# Patient Record
Sex: MALE | Race: WHITE | Employment: STUDENT | URBAN - METROPOLITAN AREA
[De-identification: names, ages, dates, MRNs, and addresses within clinical notes are randomized per-mention and may not be internally consistent; named-entity substitution may affect disease eponyms.]

---

## 2023-04-27 ENCOUNTER — HOSPITAL ENCOUNTER (INPATIENT)
Age: 23
LOS: 16 days | Discharge: HOME HEALTH | End: 2023-05-13
Attending: PHYSICAL MEDICINE & REHABILITATION | Admitting: PHYSICAL MEDICINE & REHABILITATION
Payer: MEDICARE

## 2023-04-27 DIAGNOSIS — Z74.09 IMPAIRED MOBILITY AND ADLS: Primary | ICD-10-CM

## 2023-04-27 DIAGNOSIS — F81.9 DEVELOPMENTAL DISORDER OF SCHOLASTIC SKILLS, UNSPECIFIED: ICD-10-CM

## 2023-04-27 DIAGNOSIS — C71.6 CHILDHOOD MEDULLOBLASTOMA (HCC): ICD-10-CM

## 2023-04-27 DIAGNOSIS — I63.212 CEREBROVASCULAR ACCIDENT (CVA) DUE TO STENOSIS OF LEFT VERTEBRAL ARTERY (HCC): ICD-10-CM

## 2023-04-27 DIAGNOSIS — Z78.9 IMPAIRED MOBILITY AND ADLS: Primary | ICD-10-CM

## 2023-04-27 PROCEDURE — 6370000000 HC RX 637 (ALT 250 FOR IP): Performed by: PHYSICAL MEDICINE & REHABILITATION

## 2023-04-27 PROCEDURE — 1180000000 HC REHAB R&B

## 2023-04-27 RX ORDER — LANOLIN ALCOHOL/MO/W.PET/CERES
3 CREAM (GRAM) TOPICAL NIGHTLY PRN
Status: DISCONTINUED | OUTPATIENT
Start: 2023-04-27 | End: 2023-05-13 | Stop reason: HOSPADM

## 2023-04-27 RX ORDER — ASPIRIN 81 MG/1
81 TABLET, CHEWABLE ORAL DAILY
Status: DISCONTINUED | OUTPATIENT
Start: 2023-04-29 | End: 2023-05-13 | Stop reason: HOSPADM

## 2023-04-27 RX ORDER — CLOPIDOGREL BISULFATE 75 MG/1
75 TABLET ORAL DAILY
Status: DISCONTINUED | OUTPATIENT
Start: 2023-04-27 | End: 2023-05-13 | Stop reason: HOSPADM

## 2023-04-27 RX ORDER — ATORVASTATIN CALCIUM 40 MG/1
40 TABLET, FILM COATED ORAL NIGHTLY
Status: DISCONTINUED | OUTPATIENT
Start: 2023-04-27 | End: 2023-05-13 | Stop reason: HOSPADM

## 2023-04-27 RX ORDER — CHOLECALCIFEROL (VITAMIN D3) 125 MCG
500 CAPSULE ORAL DAILY
Status: DISCONTINUED | OUTPATIENT
Start: 2023-04-27 | End: 2023-05-13 | Stop reason: HOSPADM

## 2023-04-27 RX ORDER — LANOLIN ALCOHOL/MO/W.PET/CERES
25 CREAM (GRAM) TOPICAL DAILY
Status: DISCONTINUED | OUTPATIENT
Start: 2023-04-27 | End: 2023-05-13 | Stop reason: HOSPADM

## 2023-04-27 RX ORDER — FOLIC ACID 1 MG/1
1 TABLET ORAL DAILY
Status: DISCONTINUED | OUTPATIENT
Start: 2023-04-27 | End: 2023-05-13 | Stop reason: HOSPADM

## 2023-04-27 RX ADMIN — ATORVASTATIN CALCIUM 40 MG: 40 TABLET, FILM COATED ORAL at 21:16

## 2023-04-28 PROBLEM — H47.293 OTHER OPTIC ATROPHY, BILATERAL: Status: ACTIVE | Noted: 2020-01-27

## 2023-04-28 PROBLEM — G11.9 HEREDITARY ATAXIA, UNSPECIFIED (HCC): Status: ACTIVE | Noted: 2018-12-17

## 2023-04-28 PROBLEM — Z78.9 IMPAIRED MOBILITY AND ADLS: Status: ACTIVE | Noted: 2023-04-28

## 2023-04-28 PROBLEM — R13.12 DYSPHAGIA, OROPHARYNGEAL PHASE: Status: ACTIVE | Noted: 2023-04-28

## 2023-04-28 PROBLEM — Z74.09 IMPAIRED MOBILITY AND ADLS: Status: ACTIVE | Noted: 2023-04-28

## 2023-04-28 PROBLEM — R47.01 APHASIA: Status: ACTIVE | Noted: 2023-04-28

## 2023-04-28 PROBLEM — F81.9 DEVELOPMENTAL DISORDER OF SCHOLASTIC SKILLS, UNSPECIFIED: Status: ACTIVE | Noted: 2018-11-21

## 2023-04-28 PROBLEM — H25.043 POSTERIOR SUBCAPSULAR POLAR AGE-RELATED CATARACT OF BOTH EYES: Status: ACTIVE | Noted: 2020-01-27

## 2023-04-28 PROBLEM — I63.212 CEREBROVASCULAR ACCIDENT (CVA) DUE TO STENOSIS OF LEFT VERTEBRAL ARTERY (HCC): Status: ACTIVE | Noted: 2023-04-28

## 2023-04-28 PROCEDURE — 97530 THERAPEUTIC ACTIVITIES: CPT

## 2023-04-28 PROCEDURE — 85302 CLOT INHIBIT PROT C ANTIGEN: CPT

## 2023-04-28 PROCEDURE — 1180000000 HC REHAB R&B

## 2023-04-28 PROCEDURE — 85730 THROMBOPLASTIN TIME PARTIAL: CPT

## 2023-04-28 PROCEDURE — 81241 F5 GENE: CPT

## 2023-04-28 PROCEDURE — 97166 OT EVAL MOD COMPLEX 45 MIN: CPT

## 2023-04-28 PROCEDURE — 92610 EVALUATE SWALLOWING FUNCTION: CPT

## 2023-04-28 PROCEDURE — 92523 SPEECH SOUND LANG COMPREHEN: CPT

## 2023-04-28 PROCEDURE — 81291 MTHFR GENE: CPT

## 2023-04-28 PROCEDURE — 97533 SENSORY INTEGRATION: CPT

## 2023-04-28 PROCEDURE — 85305 CLOT INHIBIT PROT S TOTAL: CPT

## 2023-04-28 PROCEDURE — 97116 GAIT TRAINING THERAPY: CPT

## 2023-04-28 PROCEDURE — 86147 CARDIOLIPIN ANTIBODY EA IG: CPT

## 2023-04-28 PROCEDURE — 6360000002 HC RX W HCPCS: Performed by: PHYSICAL MEDICINE & REHABILITATION

## 2023-04-28 PROCEDURE — 82607 VITAMIN B-12: CPT

## 2023-04-28 PROCEDURE — 83090 ASSAY OF HOMOCYSTEINE: CPT

## 2023-04-28 PROCEDURE — 97162 PT EVAL MOD COMPLEX 30 MIN: CPT

## 2023-04-28 PROCEDURE — 85613 RUSSELL VIPER VENOM DILUTED: CPT

## 2023-04-28 PROCEDURE — APPSS45 APP SPLIT SHARED TIME 31-45 MINUTES: Performed by: NURSE PRACTITIONER

## 2023-04-28 PROCEDURE — 97129 THER IVNTJ 1ST 15 MIN: CPT

## 2023-04-28 PROCEDURE — 99222 1ST HOSP IP/OBS MODERATE 55: CPT | Performed by: PHYSICAL MEDICINE & REHABILITATION

## 2023-04-28 PROCEDURE — 99222 1ST HOSP IP/OBS MODERATE 55: CPT | Performed by: PSYCHIATRY & NEUROLOGY

## 2023-04-28 PROCEDURE — 97112 NEUROMUSCULAR REEDUCATION: CPT

## 2023-04-28 PROCEDURE — 85610 PROTHROMBIN TIME: CPT

## 2023-04-28 PROCEDURE — 86225 DNA ANTIBODY NATIVE: CPT

## 2023-04-28 PROCEDURE — 6370000000 HC RX 637 (ALT 250 FOR IP): Performed by: PHYSICAL MEDICINE & REHABILITATION

## 2023-04-28 PROCEDURE — 82746 ASSAY OF FOLIC ACID SERUM: CPT

## 2023-04-28 RX ORDER — VITAMIN B COMPLEX
2000 TABLET ORAL
Status: DISCONTINUED | OUTPATIENT
Start: 2023-04-28 | End: 2023-05-13 | Stop reason: HOSPADM

## 2023-04-28 RX ORDER — SODIUM PHOSPHATE, DIBASIC AND SODIUM PHOSPHATE, MONOBASIC 7; 19 G/133ML; G/133ML
1 ENEMA RECTAL DAILY PRN
Status: DISCONTINUED | OUTPATIENT
Start: 2023-04-28 | End: 2023-05-13 | Stop reason: HOSPADM

## 2023-04-28 RX ORDER — UBIDECARENONE 100 MG
100 CAPSULE ORAL DAILY
Status: DISCONTINUED | OUTPATIENT
Start: 2023-04-28 | End: 2023-05-13 | Stop reason: HOSPADM

## 2023-04-28 RX ORDER — BISACODYL 10 MG
10 SUPPOSITORY, RECTAL RECTAL DAILY PRN
Status: DISCONTINUED | OUTPATIENT
Start: 2023-04-28 | End: 2023-05-13 | Stop reason: HOSPADM

## 2023-04-28 RX ORDER — CYANOCOBALAMIN 1000 UG/ML
1000 INJECTION, SOLUTION INTRAMUSCULAR; SUBCUTANEOUS WEEKLY
Status: DISCONTINUED | OUTPATIENT
Start: 2023-04-28 | End: 2023-05-13 | Stop reason: HOSPADM

## 2023-04-28 RX ORDER — LIDOCAINE 4 G/G
3 PATCH TOPICAL DAILY
Status: DISCONTINUED | OUTPATIENT
Start: 2023-04-28 | End: 2023-05-02

## 2023-04-28 RX ORDER — ACETAMINOPHEN 325 MG/1
650 TABLET ORAL EVERY 4 HOURS PRN
Status: DISCONTINUED | OUTPATIENT
Start: 2023-04-28 | End: 2023-05-13 | Stop reason: HOSPADM

## 2023-04-28 RX ADMIN — Medication 100 MG: at 09:14

## 2023-04-28 RX ADMIN — Medication 25 MG: at 09:15

## 2023-04-28 RX ADMIN — ATORVASTATIN CALCIUM 40 MG: 40 TABLET, FILM COATED ORAL at 20:47

## 2023-04-28 RX ADMIN — CYANOCOBALAMIN 1000 MCG: 1000 INJECTION, SOLUTION INTRAMUSCULAR; SUBCUTANEOUS at 09:14

## 2023-04-28 RX ADMIN — CYANOCOBALAMIN TAB 500 MCG 500 MCG: 500 TAB at 09:15

## 2023-04-28 RX ADMIN — CLOPIDOGREL BISULFATE 75 MG: 75 TABLET ORAL at 09:15

## 2023-04-28 RX ADMIN — Medication 2000 UNITS: at 17:35

## 2023-04-28 RX ADMIN — FOLIC ACID 1 MG: 1 TABLET ORAL at 09:14

## 2023-04-28 ASSESSMENT — ENCOUNTER SYMPTOMS
CHEST TIGHTNESS: 0
TROUBLE SWALLOWING: 0
VOICE CHANGE: 1
BLOOD IN STOOL: 0
SHORTNESS OF BREATH: 1
VOMITING: 0
STRIDOR: 0
NAUSEA: 0
DIARRHEA: 0
CONSTIPATION: 1
VISUAL CHANGE: 1
COUGH: 0
BACK PAIN: 1
SORE THROAT: 0
WHEEZING: 0
SHORTNESS OF BREATH: 0
EYES NEGATIVE: 1
EYE REDNESS: 0
EYE PAIN: 0
COLOR CHANGE: 0
PHOTOPHOBIA: 0
TROUBLE SWALLOWING: 1
ABDOMINAL PAIN: 0

## 2023-04-29 PROBLEM — R27.0 ATAXIA: Status: ACTIVE | Noted: 2018-12-17

## 2023-04-29 LAB
FOLATE: 17 NG/ML
HOMOCYSTEINE: 40.8 UMOL/L
VITAMIN B-12: >2000 PG/ML (ref 232–1245)

## 2023-04-29 PROCEDURE — 97530 THERAPEUTIC ACTIVITIES: CPT

## 2023-04-29 PROCEDURE — 1180000000 HC REHAB R&B

## 2023-04-29 PROCEDURE — 97110 THERAPEUTIC EXERCISES: CPT

## 2023-04-29 PROCEDURE — 99222 1ST HOSP IP/OBS MODERATE 55: CPT | Performed by: INTERNAL MEDICINE

## 2023-04-29 PROCEDURE — 6370000000 HC RX 637 (ALT 250 FOR IP): Performed by: PHYSICAL MEDICINE & REHABILITATION

## 2023-04-29 PROCEDURE — 97116 GAIT TRAINING THERAPY: CPT

## 2023-04-29 PROCEDURE — 97535 SELF CARE MNGMENT TRAINING: CPT

## 2023-04-29 RX ADMIN — CYANOCOBALAMIN TAB 500 MCG 500 MCG: 500 TAB at 09:15

## 2023-04-29 RX ADMIN — Medication 100 MG: at 09:15

## 2023-04-29 RX ADMIN — ASPIRIN 81 MG CHEWABLE TABLET 81 MG: 81 TABLET CHEWABLE at 09:15

## 2023-04-29 RX ADMIN — CLOPIDOGREL BISULFATE 75 MG: 75 TABLET ORAL at 09:15

## 2023-04-29 RX ADMIN — FOLIC ACID 1 MG: 1 TABLET ORAL at 09:15

## 2023-04-29 RX ADMIN — ATORVASTATIN CALCIUM 40 MG: 40 TABLET, FILM COATED ORAL at 22:09

## 2023-04-29 RX ADMIN — Medication 25 MG: at 09:15

## 2023-04-29 RX ADMIN — Medication 2000 UNITS: at 16:56

## 2023-04-30 PROBLEM — I63.20 CEREBROVASCULAR ACCIDENT (CVA) DUE TO STENOSIS OF PRECEREBRAL ARTERY (HCC): Status: ACTIVE | Noted: 2023-04-28

## 2023-04-30 PROCEDURE — 99233 SBSQ HOSP IP/OBS HIGH 50: CPT | Performed by: PHYSICAL MEDICINE & REHABILITATION

## 2023-04-30 PROCEDURE — 99232 SBSQ HOSP IP/OBS MODERATE 35: CPT | Performed by: INTERNAL MEDICINE

## 2023-04-30 PROCEDURE — 1180000000 HC REHAB R&B

## 2023-04-30 PROCEDURE — 6370000000 HC RX 637 (ALT 250 FOR IP): Performed by: PHYSICAL MEDICINE & REHABILITATION

## 2023-04-30 RX ADMIN — Medication 100 MG: at 08:28

## 2023-04-30 RX ADMIN — CLOPIDOGREL BISULFATE 75 MG: 75 TABLET ORAL at 08:28

## 2023-04-30 RX ADMIN — CYANOCOBALAMIN TAB 500 MCG 500 MCG: 500 TAB at 08:28

## 2023-04-30 RX ADMIN — FOLIC ACID 1 MG: 1 TABLET ORAL at 08:28

## 2023-04-30 RX ADMIN — Medication 2000 UNITS: at 16:44

## 2023-04-30 RX ADMIN — ATORVASTATIN CALCIUM 40 MG: 40 TABLET, FILM COATED ORAL at 20:40

## 2023-04-30 RX ADMIN — ASPIRIN 81 MG CHEWABLE TABLET 81 MG: 81 TABLET CHEWABLE at 08:32

## 2023-04-30 RX ADMIN — Medication 25 MG: at 08:28

## 2023-05-01 LAB
ANION GAP SERPL CALCULATED.3IONS-SCNC: 13 MEQ/L (ref 9–15)
APTT IMM NP PPP: NORMAL SEC (ref 32–48)
APTT P HEP NEUT PPP: NORMAL SEC (ref 32–48)
BUN SERPL-MCNC: 14 MG/DL (ref 6–20)
CALCIUM SERPL-MCNC: 9.7 MG/DL (ref 8.5–9.9)
CARDIOLIPIN IGG SER IA-ACNC: <10 GPL
CARDIOLIPIN IGM SER IA-ACNC: <10 MPL
CHLORIDE SERPL-SCNC: 100 MEQ/L (ref 95–107)
CO2 SERPL-SCNC: 22 MEQ/L (ref 20–31)
CONFIRM APTT STACLOT: NORMAL
CREAT SERPL-MCNC: 0.87 MG/DL (ref 0.7–1.2)
DRVVT SCREEN TO CONFIRM RATIO: NORMAL RATIO
ERYTHROCYTE [DISTWIDTH] IN BLOOD BY AUTOMATED COUNT: 13.7 % (ref 11.5–14.5)
GLUCOSE SERPL-MCNC: 99 MG/DL (ref 70–99)
HCT VFR BLD AUTO: 41 % (ref 42–52)
HGB BLD-MCNC: 14.3 G/DL (ref 14–18)
LA 3 SCREEN W REFLEX-IMP: NORMAL
LA NT DPL PPP QL: NORMAL
LV EF: 58 %
LVEF MODALITY: NORMAL
MCH RBC QN AUTO: 29.9 PG (ref 27–31.3)
MCHC RBC AUTO-ENTMCNC: 34.9 % (ref 33–37)
MCV RBC AUTO: 85.8 FL (ref 79–92.2)
MIXING DRVVT: NORMAL SEC (ref 33–44)
PLATELET # BLD AUTO: 360 K/UL (ref 130–400)
POTASSIUM SERPL-SCNC: 4.3 MEQ/L (ref 3.4–4.9)
PROT C AG ACT/NOR PPP IA: >95 % (ref 63–153)
PROT S AG ACT/NOR PPP IA: 135 % (ref 84–134)
PROTHROMBIN TIME: 12.9 SEC (ref 12–15.5)
RBC # BLD AUTO: 4.77 M/UL (ref 4.7–6.1)
REPTILASE TIME: NORMAL SEC
SCREEN APTT: 44 SEC (ref 32–48)
SCREEN DRVVT: 34 SEC (ref 33–44)
SODIUM SERPL-SCNC: 135 MEQ/L (ref 135–144)
THROMBIN TIME: NORMAL SEC (ref 14.7–19.5)
WBC # BLD AUTO: 10.4 K/UL (ref 4.8–10.8)

## 2023-05-01 PROCEDURE — 80048 BASIC METABOLIC PNL TOTAL CA: CPT

## 2023-05-01 PROCEDURE — 99233 SBSQ HOSP IP/OBS HIGH 50: CPT | Performed by: PHYSICAL MEDICINE & REHABILITATION

## 2023-05-01 PROCEDURE — 93312 ECHO TRANSESOPHAGEAL: CPT | Performed by: INTERNAL MEDICINE

## 2023-05-01 PROCEDURE — 36415 COLL VENOUS BLD VENIPUNCTURE: CPT

## 2023-05-01 PROCEDURE — 97533 SENSORY INTEGRATION: CPT

## 2023-05-01 PROCEDURE — 6360000002 HC RX W HCPCS: Performed by: INTERNAL MEDICINE

## 2023-05-01 PROCEDURE — 2580000003 HC RX 258: Performed by: INTERNAL MEDICINE

## 2023-05-01 PROCEDURE — 6360000002 HC RX W HCPCS

## 2023-05-01 PROCEDURE — 99231 SBSQ HOSP IP/OBS SF/LOW 25: CPT | Performed by: NURSE PRACTITIONER

## 2023-05-01 PROCEDURE — 93325 DOPPLER ECHO COLOR FLOW MAPG: CPT

## 2023-05-01 PROCEDURE — 85027 COMPLETE CBC AUTOMATED: CPT

## 2023-05-01 PROCEDURE — 1180000000 HC REHAB R&B

## 2023-05-01 PROCEDURE — 6370000000 HC RX 637 (ALT 250 FOR IP): Performed by: PHYSICAL MEDICINE & REHABILITATION

## 2023-05-01 PROCEDURE — 97535 SELF CARE MNGMENT TRAINING: CPT

## 2023-05-01 PROCEDURE — 97116 GAIT TRAINING THERAPY: CPT

## 2023-05-01 PROCEDURE — 97530 THERAPEUTIC ACTIVITIES: CPT

## 2023-05-01 PROCEDURE — 97110 THERAPEUTIC EXERCISES: CPT

## 2023-05-01 PROCEDURE — 93312 ECHO TRANSESOPHAGEAL: CPT

## 2023-05-01 PROCEDURE — 6370000000 HC RX 637 (ALT 250 FOR IP)

## 2023-05-01 PROCEDURE — 93321 DOPPLER ECHO F-UP/LMTD STD: CPT

## 2023-05-01 RX ORDER — MIDAZOLAM HYDROCHLORIDE 2 MG/2ML
INJECTION, SOLUTION INTRAMUSCULAR; INTRAVENOUS
Status: COMPLETED | OUTPATIENT
Start: 2023-05-01 | End: 2023-05-01

## 2023-05-01 RX ORDER — SODIUM CHLORIDE 0.9 % (FLUSH) 0.9 %
5-40 SYRINGE (ML) INJECTION EVERY 12 HOURS SCHEDULED
Status: DISCONTINUED | OUTPATIENT
Start: 2023-05-01 | End: 2023-05-02 | Stop reason: ALTCHOICE

## 2023-05-01 RX ORDER — FENTANYL CITRATE 0.05 MG/ML
INJECTION, SOLUTION INTRAMUSCULAR; INTRAVENOUS
Status: COMPLETED | OUTPATIENT
Start: 2023-05-01 | End: 2023-05-01

## 2023-05-01 RX ORDER — SODIUM CHLORIDE 9 MG/ML
INJECTION, SOLUTION INTRAVENOUS PRN
Status: DISCONTINUED | OUTPATIENT
Start: 2023-05-01 | End: 2023-05-13 | Stop reason: HOSPADM

## 2023-05-01 RX ORDER — SODIUM CHLORIDE 0.9 % (FLUSH) 0.9 %
5-40 SYRINGE (ML) INJECTION PRN
Status: DISCONTINUED | OUTPATIENT
Start: 2023-05-01 | End: 2023-05-13 | Stop reason: HOSPADM

## 2023-05-01 RX ADMIN — FENTANYL CITRATE 50 MCG: 0.05 INJECTION, SOLUTION INTRAMUSCULAR; INTRAVENOUS at 11:32

## 2023-05-01 RX ADMIN — Medication 10 ML: at 13:16

## 2023-05-01 RX ADMIN — MIDAZOLAM HYDROCHLORIDE 2 MG: 1 INJECTION, SOLUTION INTRAMUSCULAR; INTRAVENOUS at 11:32

## 2023-05-01 RX ADMIN — ATORVASTATIN CALCIUM 40 MG: 40 TABLET, FILM COATED ORAL at 21:28

## 2023-05-01 RX ADMIN — Medication 25 MG: at 09:00

## 2023-05-01 RX ADMIN — FENTANYL CITRATE 25 MCG: 0.05 INJECTION, SOLUTION INTRAMUSCULAR; INTRAVENOUS at 11:34

## 2023-05-01 RX ADMIN — FENTANYL CITRATE 25 MCG: 0.05 INJECTION, SOLUTION INTRAMUSCULAR; INTRAVENOUS at 11:38

## 2023-05-01 RX ADMIN — MIDAZOLAM HYDROCHLORIDE 1 MG: 1 INJECTION, SOLUTION INTRAMUSCULAR; INTRAVENOUS at 11:43

## 2023-05-01 RX ADMIN — CYANOCOBALAMIN TAB 500 MCG 500 MCG: 500 TAB at 09:00

## 2023-05-01 RX ADMIN — MIDAZOLAM HYDROCHLORIDE 1 MG: 1 INJECTION, SOLUTION INTRAMUSCULAR; INTRAVENOUS at 11:34

## 2023-05-01 RX ADMIN — FOLIC ACID 1 MG: 1 TABLET ORAL at 09:00

## 2023-05-01 RX ADMIN — Medication 2000 UNITS: at 16:16

## 2023-05-01 RX ADMIN — ASPIRIN 81 MG CHEWABLE TABLET 81 MG: 81 TABLET CHEWABLE at 13:14

## 2023-05-01 RX ADMIN — CLOPIDOGREL BISULFATE 75 MG: 75 TABLET ORAL at 13:14

## 2023-05-01 RX ADMIN — Medication 100 MG: at 09:00

## 2023-05-01 RX ADMIN — MIDAZOLAM HYDROCHLORIDE 1 MG: 1 INJECTION, SOLUTION INTRAMUSCULAR; INTRAVENOUS at 11:38

## 2023-05-01 ASSESSMENT — ENCOUNTER SYMPTOMS
ABDOMINAL PAIN: 0
VOMITING: 0
ABDOMINAL DISTENTION: 0
WHEEZING: 0
COLOR CHANGE: 0
CHEST TIGHTNESS: 0
NAUSEA: 0
COUGH: 0
TROUBLE SWALLOWING: 0
SHORTNESS OF BREATH: 0

## 2023-05-02 LAB
MTHFR C.1298A>C GENO BLD/T: NEGATIVE
MTHFR C.677C>T GENO BLD/T: NORMAL
MTHFR GENE MUT ANL BLD/T: NORMAL
SPECIMEN SOURCE: NORMAL

## 2023-05-02 PROCEDURE — 99232 SBSQ HOSP IP/OBS MODERATE 35: CPT | Performed by: PHYSICAL MEDICINE & REHABILITATION

## 2023-05-02 PROCEDURE — 1180000000 HC REHAB R&B

## 2023-05-02 PROCEDURE — 97116 GAIT TRAINING THERAPY: CPT

## 2023-05-02 PROCEDURE — 97112 NEUROMUSCULAR REEDUCATION: CPT

## 2023-05-02 PROCEDURE — 97110 THERAPEUTIC EXERCISES: CPT

## 2023-05-02 PROCEDURE — 6370000000 HC RX 637 (ALT 250 FOR IP): Performed by: PHYSICAL MEDICINE & REHABILITATION

## 2023-05-02 PROCEDURE — 97530 THERAPEUTIC ACTIVITIES: CPT

## 2023-05-02 PROCEDURE — 97535 SELF CARE MNGMENT TRAINING: CPT

## 2023-05-02 RX ORDER — LIDOCAINE 4 G/G
3 PATCH TOPICAL DAILY PRN
Status: DISCONTINUED | OUTPATIENT
Start: 2023-05-02 | End: 2023-05-13

## 2023-05-02 RX ADMIN — ASPIRIN 81 MG CHEWABLE TABLET 81 MG: 81 TABLET CHEWABLE at 08:37

## 2023-05-02 RX ADMIN — CYANOCOBALAMIN TAB 500 MCG 500 MCG: 500 TAB at 08:37

## 2023-05-02 RX ADMIN — ATORVASTATIN CALCIUM 40 MG: 40 TABLET, FILM COATED ORAL at 20:26

## 2023-05-02 RX ADMIN — CLOPIDOGREL BISULFATE 75 MG: 75 TABLET ORAL at 08:38

## 2023-05-02 RX ADMIN — FOLIC ACID 1 MG: 1 TABLET ORAL at 08:38

## 2023-05-02 RX ADMIN — Medication 2000 UNITS: at 16:28

## 2023-05-02 RX ADMIN — Medication 100 MG: at 08:38

## 2023-05-02 RX ADMIN — Medication 25 MG: at 08:38

## 2023-05-03 LAB — F5 P.R506Q BLD/T QL: NEGATIVE

## 2023-05-03 PROCEDURE — 99231 SBSQ HOSP IP/OBS SF/LOW 25: CPT | Performed by: NURSE PRACTITIONER

## 2023-05-03 PROCEDURE — 99232 SBSQ HOSP IP/OBS MODERATE 35: CPT | Performed by: PHYSICAL MEDICINE & REHABILITATION

## 2023-05-03 PROCEDURE — 97530 THERAPEUTIC ACTIVITIES: CPT

## 2023-05-03 PROCEDURE — 97535 SELF CARE MNGMENT TRAINING: CPT

## 2023-05-03 PROCEDURE — 97116 GAIT TRAINING THERAPY: CPT

## 2023-05-03 PROCEDURE — 97112 NEUROMUSCULAR REEDUCATION: CPT

## 2023-05-03 PROCEDURE — 6370000000 HC RX 637 (ALT 250 FOR IP): Performed by: PHYSICAL MEDICINE & REHABILITATION

## 2023-05-03 PROCEDURE — 1180000000 HC REHAB R&B

## 2023-05-03 RX ADMIN — CYANOCOBALAMIN TAB 500 MCG 500 MCG: 500 TAB at 09:34

## 2023-05-03 RX ADMIN — Medication 25 MG: at 09:34

## 2023-05-03 RX ADMIN — Medication 100 MG: at 09:34

## 2023-05-03 RX ADMIN — ATORVASTATIN CALCIUM 40 MG: 40 TABLET, FILM COATED ORAL at 20:37

## 2023-05-03 RX ADMIN — ASPIRIN 81 MG CHEWABLE TABLET 81 MG: 81 TABLET CHEWABLE at 09:34

## 2023-05-03 RX ADMIN — CLOPIDOGREL BISULFATE 75 MG: 75 TABLET ORAL at 09:34

## 2023-05-03 RX ADMIN — FOLIC ACID 1 MG: 1 TABLET ORAL at 09:34

## 2023-05-03 RX ADMIN — Medication 2000 UNITS: at 18:23

## 2023-05-03 ASSESSMENT — ENCOUNTER SYMPTOMS
VOMITING: 0
COUGH: 0
CHEST TIGHTNESS: 0
ABDOMINAL PAIN: 0
WHEEZING: 0
SHORTNESS OF BREATH: 0
COLOR CHANGE: 0
NAUSEA: 0
TROUBLE SWALLOWING: 0
ABDOMINAL DISTENTION: 0

## 2023-05-04 PROCEDURE — 97535 SELF CARE MNGMENT TRAINING: CPT

## 2023-05-04 PROCEDURE — 99232 SBSQ HOSP IP/OBS MODERATE 35: CPT | Performed by: PHYSICAL MEDICINE & REHABILITATION

## 2023-05-04 PROCEDURE — 97116 GAIT TRAINING THERAPY: CPT

## 2023-05-04 PROCEDURE — 1180000000 HC REHAB R&B

## 2023-05-04 PROCEDURE — 6370000000 HC RX 637 (ALT 250 FOR IP): Performed by: PHYSICAL MEDICINE & REHABILITATION

## 2023-05-04 PROCEDURE — 97530 THERAPEUTIC ACTIVITIES: CPT

## 2023-05-04 PROCEDURE — 97112 NEUROMUSCULAR REEDUCATION: CPT

## 2023-05-04 RX ADMIN — Medication 25 MG: at 08:10

## 2023-05-04 RX ADMIN — ATORVASTATIN CALCIUM 40 MG: 40 TABLET, FILM COATED ORAL at 20:07

## 2023-05-04 RX ADMIN — FOLIC ACID 1 MG: 1 TABLET ORAL at 08:10

## 2023-05-04 RX ADMIN — Medication 100 MG: at 08:10

## 2023-05-04 RX ADMIN — CYANOCOBALAMIN TAB 500 MCG 500 MCG: 500 TAB at 08:10

## 2023-05-04 RX ADMIN — ASPIRIN 81 MG CHEWABLE TABLET 81 MG: 81 TABLET CHEWABLE at 08:10

## 2023-05-04 RX ADMIN — Medication 2000 UNITS: at 17:18

## 2023-05-04 RX ADMIN — CLOPIDOGREL BISULFATE 75 MG: 75 TABLET ORAL at 08:10

## 2023-05-05 PROCEDURE — 97535 SELF CARE MNGMENT TRAINING: CPT

## 2023-05-05 PROCEDURE — 97112 NEUROMUSCULAR REEDUCATION: CPT

## 2023-05-05 PROCEDURE — 97530 THERAPEUTIC ACTIVITIES: CPT

## 2023-05-05 PROCEDURE — 6370000000 HC RX 637 (ALT 250 FOR IP): Performed by: PHYSICAL MEDICINE & REHABILITATION

## 2023-05-05 PROCEDURE — 99232 SBSQ HOSP IP/OBS MODERATE 35: CPT | Performed by: PHYSICAL MEDICINE & REHABILITATION

## 2023-05-05 PROCEDURE — 97116 GAIT TRAINING THERAPY: CPT

## 2023-05-05 PROCEDURE — 97110 THERAPEUTIC EXERCISES: CPT

## 2023-05-05 PROCEDURE — 99231 SBSQ HOSP IP/OBS SF/LOW 25: CPT | Performed by: NURSE PRACTITIONER

## 2023-05-05 PROCEDURE — 1180000000 HC REHAB R&B

## 2023-05-05 PROCEDURE — 6360000002 HC RX W HCPCS: Performed by: PHYSICAL MEDICINE & REHABILITATION

## 2023-05-05 RX ADMIN — ASPIRIN 81 MG CHEWABLE TABLET 81 MG: 81 TABLET CHEWABLE at 08:51

## 2023-05-05 RX ADMIN — FOLIC ACID 1 MG: 1 TABLET ORAL at 08:50

## 2023-05-05 RX ADMIN — Medication 100 MG: at 08:50

## 2023-05-05 RX ADMIN — ATORVASTATIN CALCIUM 40 MG: 40 TABLET, FILM COATED ORAL at 19:40

## 2023-05-05 RX ADMIN — CYANOCOBALAMIN TAB 500 MCG 500 MCG: 500 TAB at 08:51

## 2023-05-05 RX ADMIN — Medication 25 MG: at 08:51

## 2023-05-05 RX ADMIN — CYANOCOBALAMIN 1000 MCG: 1000 INJECTION, SOLUTION INTRAMUSCULAR; SUBCUTANEOUS at 08:51

## 2023-05-05 RX ADMIN — Medication 2000 UNITS: at 17:25

## 2023-05-05 RX ADMIN — CLOPIDOGREL BISULFATE 75 MG: 75 TABLET ORAL at 08:51

## 2023-05-05 ASSESSMENT — ENCOUNTER SYMPTOMS
TROUBLE SWALLOWING: 0
NAUSEA: 0
VOMITING: 0
CHEST TIGHTNESS: 0
WHEEZING: 0
ABDOMINAL PAIN: 0
ABDOMINAL DISTENTION: 0
SHORTNESS OF BREATH: 0
COUGH: 0
COLOR CHANGE: 0

## 2023-05-06 PROCEDURE — 97112 NEUROMUSCULAR REEDUCATION: CPT

## 2023-05-06 PROCEDURE — 6370000000 HC RX 637 (ALT 250 FOR IP): Performed by: PHYSICAL MEDICINE & REHABILITATION

## 2023-05-06 PROCEDURE — 97116 GAIT TRAINING THERAPY: CPT

## 2023-05-06 PROCEDURE — 1180000000 HC REHAB R&B

## 2023-05-06 RX ADMIN — Medication 25 MG: at 10:05

## 2023-05-06 RX ADMIN — CYANOCOBALAMIN TAB 500 MCG 500 MCG: 500 TAB at 10:05

## 2023-05-06 RX ADMIN — ATORVASTATIN CALCIUM 40 MG: 40 TABLET, FILM COATED ORAL at 23:30

## 2023-05-06 RX ADMIN — Medication 2000 UNITS: at 17:14

## 2023-05-06 RX ADMIN — CLOPIDOGREL BISULFATE 75 MG: 75 TABLET ORAL at 10:06

## 2023-05-06 RX ADMIN — FOLIC ACID 1 MG: 1 TABLET ORAL at 10:06

## 2023-05-06 RX ADMIN — Medication 100 MG: at 10:06

## 2023-05-06 RX ADMIN — ASPIRIN 81 MG CHEWABLE TABLET 81 MG: 81 TABLET CHEWABLE at 10:06

## 2023-05-07 PROCEDURE — 6370000000 HC RX 637 (ALT 250 FOR IP): Performed by: PHYSICAL MEDICINE & REHABILITATION

## 2023-05-07 PROCEDURE — 1180000000 HC REHAB R&B

## 2023-05-07 RX ADMIN — CLOPIDOGREL BISULFATE 75 MG: 75 TABLET ORAL at 08:55

## 2023-05-07 RX ADMIN — Medication 2000 UNITS: at 17:15

## 2023-05-07 RX ADMIN — Medication 25 MG: at 08:55

## 2023-05-07 RX ADMIN — CYANOCOBALAMIN TAB 500 MCG 500 MCG: 500 TAB at 08:55

## 2023-05-07 RX ADMIN — ASPIRIN 81 MG CHEWABLE TABLET 81 MG: 81 TABLET CHEWABLE at 08:55

## 2023-05-07 RX ADMIN — FOLIC ACID 1 MG: 1 TABLET ORAL at 08:56

## 2023-05-07 RX ADMIN — Medication 100 MG: at 08:55

## 2023-05-07 RX ADMIN — ATORVASTATIN CALCIUM 40 MG: 40 TABLET, FILM COATED ORAL at 20:06

## 2023-05-08 PROCEDURE — 97535 SELF CARE MNGMENT TRAINING: CPT

## 2023-05-08 PROCEDURE — 97110 THERAPEUTIC EXERCISES: CPT

## 2023-05-08 PROCEDURE — 6370000000 HC RX 637 (ALT 250 FOR IP): Performed by: PHYSICAL MEDICINE & REHABILITATION

## 2023-05-08 PROCEDURE — 99231 SBSQ HOSP IP/OBS SF/LOW 25: CPT | Performed by: NURSE PRACTITIONER

## 2023-05-08 PROCEDURE — 97112 NEUROMUSCULAR REEDUCATION: CPT

## 2023-05-08 PROCEDURE — 1180000000 HC REHAB R&B

## 2023-05-08 PROCEDURE — 99233 SBSQ HOSP IP/OBS HIGH 50: CPT | Performed by: PHYSICAL MEDICINE & REHABILITATION

## 2023-05-08 PROCEDURE — 97116 GAIT TRAINING THERAPY: CPT

## 2023-05-08 PROCEDURE — 97530 THERAPEUTIC ACTIVITIES: CPT

## 2023-05-08 RX ADMIN — ASPIRIN 81 MG CHEWABLE TABLET 81 MG: 81 TABLET CHEWABLE at 08:36

## 2023-05-08 RX ADMIN — Medication 2000 UNITS: at 16:56

## 2023-05-08 RX ADMIN — ATORVASTATIN CALCIUM 40 MG: 40 TABLET, FILM COATED ORAL at 19:34

## 2023-05-08 RX ADMIN — CYANOCOBALAMIN TAB 500 MCG 500 MCG: 500 TAB at 08:36

## 2023-05-08 RX ADMIN — Medication 25 MG: at 08:36

## 2023-05-08 RX ADMIN — CLOPIDOGREL BISULFATE 75 MG: 75 TABLET ORAL at 08:36

## 2023-05-08 RX ADMIN — FOLIC ACID 1 MG: 1 TABLET ORAL at 08:36

## 2023-05-08 RX ADMIN — Medication 100 MG: at 08:36

## 2023-05-08 ASSESSMENT — ENCOUNTER SYMPTOMS
VOMITING: 0
NAUSEA: 0
COLOR CHANGE: 0
COUGH: 0
TROUBLE SWALLOWING: 0
CHEST TIGHTNESS: 0
WHEEZING: 0
SHORTNESS OF BREATH: 0
ABDOMINAL PAIN: 0
ABDOMINAL DISTENTION: 0

## 2023-05-08 NOTE — CARE COORDINATION
[address] [place] [address] NOTE  Room: R262/R262-01  Admit Date: 2023       Date: 2023  Patient Name: Dharmesh Urena        MRN: [id number]    : 2000  (23 y.o.)  Gender: male        REHAB DIAGNOSIS:   Diagnosis: Impaired mobility and ADLs dt CVA 23 R krystle    CO MORBIDITIES:      Past Medical History:   Diagnosis Date    Childhood medulloblastoma ([facility] [address] 2010    Developmental disorder of scholastic skills, unspecified 2018     History reviewed. No pertinent surgical history.   Family / Caregiver Present: Yes (pt's aunt observed)  Restrictions  Restrictions/Precautions: Fall Risk  CASE MANAGEMENT    Social/Functional History  Social/Functional History  Lives With: Alone  Type of Home: Apartment  Home Layout: One level  Home Access: Level entry  Bathroom Shower/Tub: Tub/Shower unit, Curtain  Bathroom Toilet: Standard  Bathroom Equipment: Grab bars in shower  Bathroom Accessibility: Walker accessible  Home Equipment: None  Has the patient had two or more falls in the past year or any fall with injury in the past year?: No  Receives Help From:  (home health aide tuesday and saturday from 3:00 pm to 9:00 pm or however long he needs them)  ADL Assistance: Independent  Meal Prep Responsibility: Primary  Laundry Responsibility: Primary  Cleaning Responsibility: No  Bill Paying/Finance Responsibility: No  Shopping Responsibility: No  Health Care Management:  (was not taking meds before but can have assistance from his aunt if needed)  Ambulation Assistance: Independent  Transfer Assistance: Independent  Active : No  Patient's  Info: [address] and family  Mode of Transportation: Bus  Education: College--did not get a degree--went for the experience from [id number] (2 year program--[employer])  Occupation: Volunteer work, On disability  Type of Occupation: 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

## 2023-05-09 ENCOUNTER — APPOINTMENT (OUTPATIENT)
Dept: PRIMARY CARE | Facility: CLINIC | Age: 23
End: 2023-05-09
Payer: MEDICAID

## 2023-05-09 PROCEDURE — 97112 NEUROMUSCULAR REEDUCATION: CPT

## 2023-05-09 PROCEDURE — 99232 SBSQ HOSP IP/OBS MODERATE 35: CPT | Performed by: PHYSICAL MEDICINE & REHABILITATION

## 2023-05-09 PROCEDURE — 97530 THERAPEUTIC ACTIVITIES: CPT

## 2023-05-09 PROCEDURE — 1180000000 HC REHAB R&B

## 2023-05-09 PROCEDURE — 97116 GAIT TRAINING THERAPY: CPT

## 2023-05-09 PROCEDURE — 6370000000 HC RX 637 (ALT 250 FOR IP): Performed by: PHYSICAL MEDICINE & REHABILITATION

## 2023-05-09 PROCEDURE — 97535 SELF CARE MNGMENT TRAINING: CPT

## 2023-05-09 PROCEDURE — 97110 THERAPEUTIC EXERCISES: CPT

## 2023-05-09 RX ADMIN — CYANOCOBALAMIN TAB 500 MCG 500 MCG: 500 TAB at 08:27

## 2023-05-09 RX ADMIN — Medication 2000 UNITS: at 17:09

## 2023-05-09 RX ADMIN — FOLIC ACID 1 MG: 1 TABLET ORAL at 08:27

## 2023-05-09 RX ADMIN — CLOPIDOGREL BISULFATE 75 MG: 75 TABLET ORAL at 08:27

## 2023-05-09 RX ADMIN — ATORVASTATIN CALCIUM 40 MG: 40 TABLET, FILM COATED ORAL at 19:39

## 2023-05-09 RX ADMIN — Medication 100 MG: at 08:27

## 2023-05-09 RX ADMIN — ASPIRIN 81 MG CHEWABLE TABLET 81 MG: 81 TABLET CHEWABLE at 08:27

## 2023-05-09 RX ADMIN — Medication 25 MG: at 08:27

## 2023-05-09 NOTE — CARE COORDINATION
Pt was updated with new projected discharge date of 5/13, pt stated that he was relieved as he thought he would be extended another week. Pt is agreeable to stay until 5/13. MARYANNW then spoke with Yudelka Butterfield (aunt) and provided this update. Yudelka Butterfield voiced concern with pt returning home and is concerned with pt falling. Yudelka Butterfield inquired on obtaining an alert button. MARYANNW voiced understanding and stated that LSW can inquire on alert button from [employer]. Yudelka Butterfield in agreement but noted that it can take some time to obtain any items from [employer]. Yudelka Butterfield also voiced concern regarding pt's L eye and visual deficits. LSW notified Le Green (RN) who stated that she would notify Neurology Ashley Andrade) and request for her to speak with Yudelka Butterfield to address concerns. Yudelka Butterfield was pleased. Yudelka Butterfield provided a phone number for pt's Glean Sever ([phone]). MARYANNW called Hannah Camacho and left a voicemail noting urgency for obtaining an alert button in time for discharge on 5/13. LSW to call again.  Electronically signed by CHUCK Guevara LSW on 5/9/2023 at 5:27 PM

## 2023-05-10 PROCEDURE — 99231 SBSQ HOSP IP/OBS SF/LOW 25: CPT | Performed by: PHYSICAL MEDICINE & REHABILITATION

## 2023-05-10 PROCEDURE — 97112 NEUROMUSCULAR REEDUCATION: CPT

## 2023-05-10 PROCEDURE — 99231 SBSQ HOSP IP/OBS SF/LOW 25: CPT | Performed by: NURSE PRACTITIONER

## 2023-05-10 PROCEDURE — 97116 GAIT TRAINING THERAPY: CPT

## 2023-05-10 PROCEDURE — 6370000000 HC RX 637 (ALT 250 FOR IP): Performed by: PHYSICAL MEDICINE & REHABILITATION

## 2023-05-10 PROCEDURE — 97110 THERAPEUTIC EXERCISES: CPT

## 2023-05-10 PROCEDURE — 1180000000 HC REHAB R&B

## 2023-05-10 PROCEDURE — 97530 THERAPEUTIC ACTIVITIES: CPT

## 2023-05-10 PROCEDURE — 97535 SELF CARE MNGMENT TRAINING: CPT

## 2023-05-10 RX ADMIN — Medication 2000 UNITS: at 16:56

## 2023-05-10 RX ADMIN — Medication 100 MG: at 09:00

## 2023-05-10 RX ADMIN — ATORVASTATIN CALCIUM 40 MG: 40 TABLET, FILM COATED ORAL at 20:38

## 2023-05-10 RX ADMIN — Medication 25 MG: at 09:00

## 2023-05-10 RX ADMIN — CLOPIDOGREL BISULFATE 75 MG: 75 TABLET ORAL at 09:00

## 2023-05-10 RX ADMIN — ASPIRIN 81 MG CHEWABLE TABLET 81 MG: 81 TABLET CHEWABLE at 09:00

## 2023-05-10 RX ADMIN — FOLIC ACID 1 MG: 1 TABLET ORAL at 09:00

## 2023-05-10 ASSESSMENT — ENCOUNTER SYMPTOMS
TROUBLE SWALLOWING: 0
WHEEZING: 0
COLOR CHANGE: 0
ABDOMINAL DISTENTION: 0
NAUSEA: 0
ABDOMINAL PAIN: 0
COUGH: 0
SHORTNESS OF BREATH: 0
VOMITING: 0
CHEST TIGHTNESS: 0

## 2023-05-10 NOTE — PLAN OF CARE
Problem: Discharge Planning  Goal: Discharge to home or other facility with appropriate resources  5/10/2023 1148 by Dunia Belcher RN  Outcome: Progressing  Flowsheets (Taken 5/10/2023 0900)  Discharge to home or other facility with appropriate resources:   Identify barriers to discharge with patient and caregiver   Arrange for needed discharge resources and transportation as appropriate   Identify discharge learning needs (meds, wound care, etc)   Refer to discharge planning if patient needs post-hospital services based on physician order or complex needs related to functional status, cognitive ability or social support system  5/10/2023 0044 by Moe Albrecht RN  Outcome: Progressing     Problem: Safety - Adult  Goal: Free from fall injury  5/10/2023 1148 by Dunia Belcher RN  Outcome: Progressing  5/10/2023 0044 by Moe Albrecht RN  Outcome: Progressing     Problem: Skin/Tissue Integrity  Goal: Absence of new skin breakdown  Description: 1. Monitor for areas of redness and/or skin breakdown  2. Assess vascular access sites hourly  3. Every 4-6 hours minimum:  Change oxygen saturation probe site  4. Every 4-6 hours:  If on nasal continuous positive airway pressure, respiratory therapy assess nares and determine need for appliance change or resting period.   5/10/2023 1148 by Dunia Belcher RN  Outcome: Progressing  5/10/2023 0044 by Moe Albrecht RN  Outcome: Progressing     Problem: Chronic Conditions and Co-morbidities  Goal: Patient's chronic conditions and co-morbidity symptoms are monitored and maintained or improved  5/10/2023 1148 by Dunia Belcher RN  Outcome: Progressing  5/10/2023 0044 by Moe Albrecht RN  Outcome: Progressing

## 2023-05-10 NOTE — CARE COORDINATION
MARYNANW called Kel Fer[facility]) and left a voicemail requesting a return call.  Electronically signed by CHUCK Ray, WILLOW on 5/10/2023 at 1:06 PM

## 2023-05-11 PROCEDURE — 6370000000 HC RX 637 (ALT 250 FOR IP): Performed by: PHYSICAL MEDICINE & REHABILITATION

## 2023-05-11 PROCEDURE — 97535 SELF CARE MNGMENT TRAINING: CPT

## 2023-05-11 PROCEDURE — 97112 NEUROMUSCULAR REEDUCATION: CPT

## 2023-05-11 PROCEDURE — 99231 SBSQ HOSP IP/OBS SF/LOW 25: CPT | Performed by: PHYSICAL MEDICINE & REHABILITATION

## 2023-05-11 PROCEDURE — 1180000000 HC REHAB R&B

## 2023-05-11 PROCEDURE — 97530 THERAPEUTIC ACTIVITIES: CPT

## 2023-05-11 PROCEDURE — 97116 GAIT TRAINING THERAPY: CPT

## 2023-05-11 RX ORDER — ASPIRIN 81 MG/1
81 TABLET, CHEWABLE ORAL DAILY
Qty: 30 TABLET | Refills: 3 | Status: SHIPPED | OUTPATIENT
Start: 2023-05-12

## 2023-05-11 RX ORDER — UBIDECARENONE 100 MG
100 CAPSULE ORAL DAILY
Qty: 30 CAPSULE | Refills: 0 | Status: SHIPPED | OUTPATIENT
Start: 2023-05-12

## 2023-05-11 RX ORDER — CHOLECALCIFEROL (VITAMIN D3) 50 MCG
2000 TABLET ORAL
Qty: 60 TABLET | Refills: 0 | Status: SHIPPED | OUTPATIENT
Start: 2023-05-12

## 2023-05-11 RX ORDER — ATORVASTATIN CALCIUM 40 MG/1
40 TABLET, FILM COATED ORAL NIGHTLY
Qty: 30 TABLET | Refills: 3 | Status: SHIPPED | OUTPATIENT
Start: 2023-05-12

## 2023-05-11 RX ORDER — FOLIC ACID 1 MG/1
1 TABLET ORAL DAILY
Qty: 30 TABLET | Refills: 3 | Status: SHIPPED | OUTPATIENT
Start: 2023-05-12

## 2023-05-11 RX ORDER — CLOPIDOGREL BISULFATE 75 MG/1
75 TABLET ORAL DAILY
Qty: 30 TABLET | Refills: 3 | Status: SHIPPED | OUTPATIENT
Start: 2023-05-12

## 2023-05-11 RX ORDER — PYRIDOXINE HCL (VITAMIN B6) 25 MG
25 TABLET ORAL DAILY
Qty: 30 TABLET | Refills: 3 | Status: SHIPPED | OUTPATIENT
Start: 2023-05-12

## 2023-05-11 RX ADMIN — CLOPIDOGREL BISULFATE 75 MG: 75 TABLET ORAL at 08:26

## 2023-05-11 RX ADMIN — Medication 2000 UNITS: at 17:20

## 2023-05-11 RX ADMIN — ATORVASTATIN CALCIUM 40 MG: 40 TABLET, FILM COATED ORAL at 19:47

## 2023-05-11 RX ADMIN — Medication 25 MG: at 08:26

## 2023-05-11 RX ADMIN — FOLIC ACID 1 MG: 1 TABLET ORAL at 08:26

## 2023-05-11 RX ADMIN — ASPIRIN 81 MG CHEWABLE TABLET 81 MG: 81 TABLET CHEWABLE at 08:26

## 2023-05-11 RX ADMIN — Medication 100 MG: at 08:26

## 2023-05-11 NOTE — PLAN OF CARE
Problem: Discharge Planning  Goal: Discharge to home or other facility with appropriate resources  Outcome: Progressing  Flowsheets (Taken 5/10/2023 2030)  Discharge to home or other facility with appropriate resources: Identify barriers to discharge with patient and caregiver

## 2023-05-11 NOTE — CARE COORDINATION
[address] REHABILITATION  INDEPENDENT IN ROOM NOTE  Room: R262/R262-01  Admit Date: 2023       Date: 2023  Patient Name: Milli Ronquillo        MRN: [id number]    : 2000  (23 y.o.)  Gender: male      Diagnosis: Impaired mobility and ADLs dt CVA 23 R krystle         Discipline pre admission summary:    Nursing:  Patient orientation to the room/suite:  [x] Yes    []   No  (Safety checks to consider: Oxygen, Fire Alarm, Stove safety, Call alarm, Bed alarm,   Bed power, TV, Phone)        1. Pt physical ability to be independent in room/suite:  [x] Yes    []   No   Comment:    2. Pt demonstrates cognitive ability to be independent in room/suite:  [x] Yes    []   No   Comment:    3. Pt demonstrates emotional ability to be independent in room/suite:  [x] Yes    []   No   Comment:    4. Special Considerations/Equipment if needed: [] NA   Comment:ambulate with FWW    Recommend independent in room/suite:  [x] Yes    []   No            Signature: Electronically signed by Rey Montana RN on 2023 at 2:44 PM        Occupational Therapy:  1. Pt physical ability to be independent in room/suite:  [x] Yes    []   No   Comment:     2. Pt demonstrates cognitive ability to be independent in room/suite:  [x] Yes    []   No   Comment:    3. Pt demonstrates emotional ability to be independent in room/suite:  [x] Yes    []   No   Comment:    4. Special Considerations/Equipment if needed: [] NA   Comment:  If patient sits in the wheelchair leg rests need to be removed from the chair. Recommend use of rollator instead of ww and patient should not walk without a device    Recommend independent in room/suite:  [x] Yes    []   No         Signature: Electronically signed by MONALISA Oleary on 23 at 3:54 PM EDT       Physical Therapy:  1. Pt physical ability to be independent in room/suite:  [x] Yes    []   No   Comment:     2.  Pt demonstrates cognitive ability to be independent in

## 2023-05-11 NOTE — PLAN OF CARE
Problem: Discharge Planning  Goal: Discharge to home or other facility with appropriate resources  5/11/2023 1032 by Pj Grace RN  Outcome: Progressing  5/11/2023 0424 by Pepe Marquis RN  Outcome: Progressing  Flowsheets (Taken 5/10/2023 2030)  Discharge to home or other facility with appropriate resources: Identify barriers to discharge with patient and caregiver     Problem: Safety - Adult  Goal: Free from fall injury  5/11/2023 1032 by Pj Grace RN  Outcome: Progressing  5/11/2023 0424 by Pepe Marquis RN  Outcome: Progressing     Problem: Skin/Tissue Integrity  Goal: Absence of new skin breakdown  Description: 1. Monitor for areas of redness and/or skin breakdown  2. Assess vascular access sites hourly  3. Every 4-6 hours minimum:  Change oxygen saturation probe site  4. Every 4-6 hours:  If on nasal continuous positive airway pressure, respiratory therapy assess nares and determine need for appliance change or resting period.   5/11/2023 1032 by Pj Grace RN  Outcome: Progressing  5/11/2023 0424 by Pepe Marquis RN  Outcome: Progressing     Problem: Chronic Conditions and Co-morbidities  Goal: Patient's chronic conditions and co-morbidity symptoms are monitored and maintained or improved  5/11/2023 1032 by Pj Grace RN  Outcome: Progressing  5/11/2023 0424 by Pepe Marquis RN  Outcome: Progressing  Flowsheets (Taken 5/10/2023 2030)  Care Plan - Patient's Chronic Conditions and Co-Morbidity Symptoms are Monitored and Maintained or Improved: Monitor and assess patient's chronic conditions and comorbid symptoms for stability, deterioration, or improvement

## 2023-05-11 NOTE — CARE COORDINATION
LSW called [facility] and was forwarded to Rj. LSW called again and requested to speak to someone who is in the office due to the urgency of the matter. LSW was forwarded to [facility]) and explained the need for pt to have an alert button since he is discharging home on 5/13. Osman Campos stated that he would speak to Shira's supervisor and see what they can do, and will call LSW back. Electronically signed by CHUCK Ni, WILLOW on 5/11/2023 at 10:27 AM    LSW received message from [facility] stating that they cannot get pt a medical alert system by 5/13, and at the earliest it would arrive by the end of next week. LSW called Carla Maguirean (aunt) and updated her on what Rafaela Sullivan had stated, Carla Maguirean verbalized understanding and stated that she spoke to her as well. Carla Crawford was okay with that. LSW also discussed pt's current level of function and being tested for independence in the room today, Carla Crawford was pleased. Carla Crawford inquired on DME, LSW is ordering a rollator and shower chair w/ back through [address] in [place]. Pt will receive Nancy Crouch upon discharge as well. LSW to discuss further with pt.  Electronically signed by CHUCK Ni LSW on 5/11/2023 at 5:07 PM

## 2023-05-12 PROCEDURE — 97116 GAIT TRAINING THERAPY: CPT

## 2023-05-12 PROCEDURE — 97112 NEUROMUSCULAR REEDUCATION: CPT

## 2023-05-12 PROCEDURE — 97535 SELF CARE MNGMENT TRAINING: CPT

## 2023-05-12 PROCEDURE — 6360000002 HC RX W HCPCS: Performed by: PHYSICAL MEDICINE & REHABILITATION

## 2023-05-12 PROCEDURE — 97530 THERAPEUTIC ACTIVITIES: CPT

## 2023-05-12 PROCEDURE — 1180000000 HC REHAB R&B

## 2023-05-12 PROCEDURE — 99232 SBSQ HOSP IP/OBS MODERATE 35: CPT | Performed by: PHYSICAL MEDICINE & REHABILITATION

## 2023-05-12 PROCEDURE — 6370000000 HC RX 637 (ALT 250 FOR IP): Performed by: PHYSICAL MEDICINE & REHABILITATION

## 2023-05-12 RX ADMIN — Medication 2000 UNITS: at 16:53

## 2023-05-12 RX ADMIN — CYANOCOBALAMIN TAB 500 MCG 500 MCG: 500 TAB at 08:43

## 2023-05-12 RX ADMIN — ATORVASTATIN CALCIUM 40 MG: 40 TABLET, FILM COATED ORAL at 22:32

## 2023-05-12 RX ADMIN — CYANOCOBALAMIN 1000 MCG: 1000 INJECTION, SOLUTION INTRAMUSCULAR; SUBCUTANEOUS at 08:43

## 2023-05-12 RX ADMIN — FOLIC ACID 1 MG: 1 TABLET ORAL at 08:43

## 2023-05-12 RX ADMIN — ASPIRIN 81 MG CHEWABLE TABLET 81 MG: 81 TABLET CHEWABLE at 08:43

## 2023-05-12 RX ADMIN — CLOPIDOGREL BISULFATE 75 MG: 75 TABLET ORAL at 08:43

## 2023-05-12 RX ADMIN — Medication 100 MG: at 08:43

## 2023-05-12 RX ADMIN — Medication 25 MG: at 08:42

## 2023-05-12 NOTE — PLAN OF CARE
Problem: Discharge Planning  Goal: Discharge to home or other facility with appropriate resources  Outcome: Progressing  Flowsheets (Taken 5/12/2023 1344)  Discharge to home or other facility with appropriate resources: Identify barriers to discharge with patient and caregiver     Problem: Safety - Adult  Goal: Free from fall injury  Outcome: Progressing     Problem: Skin/Tissue Integrity  Goal: Absence of new skin breakdown  Description: 1. Monitor for areas of redness and/or skin breakdown  2. Assess vascular access sites hourly  3. Every 4-6 hours minimum:  Change oxygen saturation probe site  4. Every 4-6 hours:  If on nasal continuous positive airway pressure, respiratory therapy assess nares and determine need for appliance change or resting period.   Outcome: Progressing     Problem: Chronic Conditions and Co-morbidities  Goal: Patient's chronic conditions and co-morbidity symptoms are monitored and maintained or improved  Outcome: Progressing  Flowsheets (Taken 5/12/2023 1861)  Care Plan - Patient's Chronic Conditions and Co-Morbidity Symptoms are Monitored and Maintained or Improved: Monitor and assess patient's chronic conditions and comorbid symptoms for stability, deterioration, or improvement

## 2023-05-12 NOTE — DISCHARGE INSTR - COC
Continuity of Care Form    Patient Name: Mendoza Ruth   :  2000  MRN:  [id number]    Admit date:  2023  Discharge date:  23    Code Status Order: Full Code   Advance Directives:     Admitting Physician:  Bimal Monreal DO  PCP: [facility], [address] [place] [address]    Discharging Nurse: Lina Hines RN  [address] Unit/Room#: S410/S326-17  Discharging Unit Phone Number: [phone]    Emergency Contact:   Extended Emergency Contact Information  Primary Emergency Contact: [address] [place] [address]  Mobile Phone: [phone]  Relation: Aunt/Uncle  Secondary Emergency Contact: Lindsey Sawyer  Mobile Phone: [phone]  Relation: Aunt/Uncle    Past Surgical History:  History reviewed. No pertinent surgical history. Immunization History: There is no immunization history on file for this patient.     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    Posterior subcapsular polar age-related cataract of both eyes H25.043    Impaired mobility and ADLs dt CVA 23 R krystle Z74.09, Z78.9    Aphasia R47.01    Dysphagia, oropharyngeal phase R13.12    Cerebrovascular accident (CVA) due to stenosis of left vertebral artery (HCC) M93.698       Isolation/Infection:   Isolation            No Isolation          Patient Infection Status       None to display            Nurse Assessment:  Last Vital Signs: /84   Pulse 95   Temp 97.7 °F (36.5 °C) (Oral)   Resp 18   Ht 5' (1.524 m)   Wt 133 lb 11.2 oz (60.6 kg)   SpO2 98%   BMI 26.11 kg/m²     Last documented pain score (0-10 scale):    Last Weight:   Wt Readings from Last 1 Encounters:   23 133 lb 11.2 oz (60.6 kg)     Mental Status:  oriented, alert, thought processes intact, and able to concentrate and follow conversation    IV

## 2023-05-12 NOTE — CARE COORDINATION
LSW met with pt and discussed discharge for tomorrow. Pt confirmed he feels ready to discharge and is looking forward to being at home. HHC was discussed and given freedom of choice, pt chose [facility]. A referral was made to [facility] and they accepted. Pt's aunt was notified to  pt's rollator and shower chair w/ back from [address] in [place]. Pt is aware of this and the DME ordered that he needs to use. Pt has no concerns or questions at this time. Patient satisfaction survey completed.  Electronically signed by CHUCK Jean, LSW on 5/12/2023 at 3:17 PM

## 2023-05-12 NOTE — FLOWSHEET NOTE
RN talked with Dameon Moulton NP regarding the phone call to the patient's family. She acknowledged it.

## 2023-05-13 VITALS
OXYGEN SATURATION: 98 % | TEMPERATURE: 97.5 F | RESPIRATION RATE: 18 BRPM | HEART RATE: 82 BPM | BODY MASS INDEX: 26.25 KG/M2 | WEIGHT: 133.7 LBS | DIASTOLIC BLOOD PRESSURE: 71 MMHG | SYSTOLIC BLOOD PRESSURE: 123 MMHG | HEIGHT: 60 IN

## 2023-05-13 PROCEDURE — 97116 GAIT TRAINING THERAPY: CPT

## 2023-05-13 PROCEDURE — 6370000000 HC RX 637 (ALT 250 FOR IP): Performed by: PHYSICAL MEDICINE & REHABILITATION

## 2023-05-13 PROCEDURE — 97112 NEUROMUSCULAR REEDUCATION: CPT

## 2023-05-13 RX ADMIN — Medication 100 MG: at 09:09

## 2023-05-13 RX ADMIN — ASPIRIN 81 MG CHEWABLE TABLET 81 MG: 81 TABLET CHEWABLE at 09:09

## 2023-05-13 RX ADMIN — CYANOCOBALAMIN TAB 500 MCG 500 MCG: 500 TAB at 09:09

## 2023-05-13 RX ADMIN — CLOPIDOGREL BISULFATE 75 MG: 75 TABLET ORAL at 09:09

## 2023-05-13 RX ADMIN — FOLIC ACID 1 MG: 1 TABLET ORAL at 09:09

## 2023-05-13 RX ADMIN — Medication 25 MG: at 09:09

## 2023-05-13 NOTE — DISCHARGE INSTR - DIET

## 2023-05-13 NOTE — DISCHARGE SUMMARY
8980)  Scooting: Supervision (04/28/23 1149)  Bed Mobility Training  Bed Mobility Training: Yes (05/13/23 0954)  Overall Level of Assistance: Modified independent (05/13/23 0954)  Interventions: Safety awareness training (05/04/23 1516)  Rolling: Modified independent (05/13/23 0954)  Supine to Sit: Modified independent (05/13/23 0954)  Sit to Supine: Modified independent (05/13/23 0954)  Scooting: Modified independent (05/13/23 0954)  Transfers:  Transfers  Sit to Stand: Modified independent (05/10/23 0916)  Stand to Sit: Modified independent (05/10/23 0916)  Bed to Chair: Modified independent (05/10/23 0916)  Car Transfer: Supervision (05/09/23 1119)  Comment: Instructed pt on transfering to sit on rollator. Pt completed with supervision. (05/09/23 1119)  Transfer Training  Transfer Training: Yes (05/13/23 0954)  Overall Level of Assistance: Modified independent (05/13/23 0954)  Interventions: Safety awareness training; Tactile cues; Verbal cues (05/04/23 1516)  Sit to Stand: Modified independent (05/13/23 0954)  Stand to Sit: Modified independent (05/13/23 0954)  Stand Pivot Transfers: Modified independent (05/13/23 0954)  Toilet Transfer: Modified independent (05/13/23 0954)  Car Transfer: Modified independent (05/13/23 0954)  Transfers  Surface: To mat; Wheelchair;From mat (05/05/23 1304)  Additional Factors: Verbal cues; Hand placement cues (05/05/23 1106)  Device: Claudia Ache (05/01/23 1537)  Sit to Stand  Assistance Level: Supervision (05/05/23 1304)  Skilled Clinical Factors: increased time to stabilize in standing, [employer] used with good safety and hand placement (05/05/23 1106)  Stand to Sit  Assistance Level: Supervision;Stand by assist (05/05/23 1304)  Skilled Clinical Factors: good ecccentric control and hand placement (05/05/23 1106)  Bed To/From Chair  Technique: Stand pivot (04/29/23 0940)  Assistance Level: Stand by assist (04/29/23 0940)  Stand Pivot  Assistance Level: Stand by assist (04/29/23 0940)  Skilled

## 2023-05-13 NOTE — CARE COORDINATION
Spoke to Carlos, patient's aunt regarding PCP. Explained that [place] [address] needs a PCP to provide home care to the patient. Carlos stated that his PCP is one or two places in [place]. She gave me a few names, I goggled them, and ran the names and addresses by her. She finally said [facility], [address] [place] [address]. She stated that her nephew has been to see the doctor once. And they were told that they see whatever doctor is available, not one particular doctor. Provided information to Chrystal Gomez, [place] [address] and Zora Willard the RN. Will give information to the patient.  Electronically signed by Sanjiv Roldan RN on 5/13/23 at 2:08 PM EDT

## 2023-05-13 NOTE — DISCHARGE INSTRUCTIONS
CALL ON Monday 5/15 TO MAKE AN APPOINTMENT WITH A PRIMARY CARE PROVIDER:    [address] [facility] [address] [place] [address].

## 2023-05-17 NOTE — PROGRESS NOTES
[address] [facility]   Acute  Rehabilitation  MUSIC THERAPY      Date:  5/9/2023        Patient Name: Milli Ronquillo       MRN: [id number]        YOB: 2000 (23 y.o.)       Gender: male  Diagnosis: Imp Mob and ADLs due to CVA  Referring Practitioner: Dr Gunnar Estrella    RESTRICTIONS/PRECAUTIONS:  Restrictions/Precautions: Fall Risk  Vision: Impaired  Hearing: Within functional limits      TIME OF SESSION: 1:15pm - 1:45pm     SUBJECTIVE:  \"Sure\"    OBJECTIVE:        [x] To Improve Mood     [x] To Increase Social Well-Being  [] To Increase Focus   [] To Increase Emotional Well-Being [] To Increase Eye Contact    [] To Increase Spiritual Well-Being   [] To Decrease Anxiety   [x] To Increase Relaxation   [] To Decrease Pain    [] To Increase Communication  [] To Increase Movement to Music     MUSIC INTERVENTION PROVIDED:     [x] Live Music on Voice  [x] Recorded Music   [x] Live Music on Guitar  [x] Discussion Related to Music   [] Live Music on Q-chord  [x] Discussion Related to Pt Experience   [] Live Music on Percussion      PARTICIPATION LEVEL OF PATIENT:     [x] Active with discussion   [] Passive with discussion   [] Active with singing    [x] Passive with singing   [] Active with instrument playing  [] Passive with instrument playing   [x] Actively listening to music   [] Passively listening to music.      OUTCOMES OBSERVED:      [x] Improved Mood   [x] Increased Social Well-Being  [] Increased Focus   [] Increased Emotional Well-Being  [] Increased Eye Contact    [] Increased Spiritual Well-Being   [] Decreased Anxiety   [x] Increased Relaxation   [] Decreased Pain    [] Increased Communication   [] Increased Movement to Music     PAIN ASSESSMENT    Before MT:      [x] No     [] Yes   Location:    Rating:  /10    Comment(s):    After MT:         [x] No     [] Yes   Location:     Rating:   /10    Comment(s):     ANXIETY ASSESSMENT    Before MT:      [x] No     [] Yes   Rating:  /10    Comment(s):    After MT:
[id number] [facility] Neurology Daily Progress Note  Name: Jacob Valdivia  Age: 23 y.o. Gender: male  CodeStatus: Full Code  Allergies: No Known Allergies    Chief Complaint:No chief complaint on file. Primary Care Provider: Asael Centeno MD  InpatientTreatment Team: Treatment Team: Attending Provider: Theodore Oseguera DO; Consulting Physician: Martina Naqvi MD; Consulting Physician: Maria E Ortega MD; Consulting Physician: Zohaib Rome, PhD; Consulting Physician: Nadine Vivas DO; Registered Nurse: Sammy Mensah RN; Occupational Therapist Assistant: KRISTINA Chang; Occupational Therapist: Etta Martinez OTR/L; Occupational Therapist: Kamron Snow OT; : CHUCK Tamayo, LSW; Patient Care Tech: Ursula Sheikh  Admission Date: 4/27/2023      HPI   Pt seen and examined on rehab unit for neurology follow-up. Currently alert and oriented x3, no acute distress, cooperative. Patient denies headache. Has had intermittent blurred vision since stroke. No diplopia. No dysphagia, aphasia or dysarthria. No one-sided weakness. Ongoing ataxia and disequilibrium noted. Denies dizziness. Afebrile. Blood pressure stable. No seizure activity reported. Patient denies eye pain, eye drainage, headache. Extraocular eye movements intact. Pupils equal and reactive. No photophobia. Blood pressure stable. Vitals:    05/10/23 0826   BP: 115/73   Pulse: (!) 103   Resp: 18   Temp: 98.1 °F (36.7 °C)   SpO2: 98%        Review of Systems   Constitutional:  Negative for appetite change and fever. HENT:  Negative for hearing loss and trouble swallowing. Eyes:  Positive for visual disturbance. Respiratory:  Negative for cough, chest tightness, shortness of breath and wheezing. Cardiovascular:  Negative for chest pain, palpitations and leg swelling. Gastrointestinal:  Negative for abdominal distention, abdominal pain, nausea and vomiting. Musculoskeletal:  Positive for gait problem.    Skin:
Assessment completed. VSS. Denied pain. LBM 5/8. No voiced complaints or needs. No distress noted. Call light within reach and bed alarm activated.   Electronically signed by Lorry Dakins, RN on 5/9/2023 at 12:56 AM
Assessment completed. VSS. Denied pain. LBM 5/9. No voiced complaints or needs. No distress noted. Call light within reach and bed alarm activated.   Electronically signed by Dora Lezama RN on 5/10/2023 at 2:52 AM
Discharge instructions and medications reviewed with pt and POA and d/c paperwork signed. Prescriptions provided. Electronically signed by Paramjit Linares RN on 5/13/2023 at 3:15 PM    Pt taken via w/c to hospital exit with all personal belongings accompanied by transport personnel and family members.  Electronically signed by Paramjit Linares RN on 5/13/2023 at 3:16 PM
Followup    Patient was seated on his bed this morning. His affect was more cheerful than at previous encounters. 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 He has accepted that he will need to use a rollator at home. He also expects to work with [address] [place] [address] [employer]. He has significant family support.
[facility] OCCUPATIONAL THERAPY DISCHARGE SUMMARY- REHAB     Date: 2023  Patient Name: Buzz Quezada        MRN: [id number]  Account: [de-identified]   : 2000  (23 y.o.)  Room: [place] [address]    Diagnosis:  Imp Mob and ADLs due to CVA    Past Medical History:   Diagnosis Date    Childhood medulloblastoma ([address] 2010    Developmental disorder of scholastic skills, unspecified 2018     History reviewed. No pertinent surgical history.     Precautions:  None    Social/Functional History:  Social/Functional History  Lives With: Alone  Type of Home: Apartment  Home Layout: One level  Home Access: Level entry  Bathroom Shower/Tub: Tub/Shower unit, Curtain  Bathroom Toilet: Standard  Bathroom Equipment: Grab bars in shower  Bathroom Accessibility: Walker accessible  Home Equipment: None  Has the patient had two or more falls in the past year or any fall with injury in the past year?: No  Receives Help From:  (home health aide tuesday and saturday from 3:00 pm to 9:00 pm or however long he needs them)  ADL Assistance: Independent  Meal Prep Responsibility: Primary  Laundry Responsibility: Primary  Cleaning Responsibility: No  Bill Paying/Finance Responsibility: No  Shopping Responsibility: No  Health Care Management:  (was not taking meds before but can have assistance from his aunt if needed)  Ambulation Assistance: Independent  Transfer Assistance: Independent  Active : No  Patient's  Info: [address] and family  Mode of Transportation: Bus  Education: College--did not get a degree--went for the experience from [employer]40-[employer]80 (2 year program--[employer])  Occupation: Volunteer work, On disability  Type of Occupation: 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
[facility] Neurology Daily Progress Note  Name: Tray Larios  Age: 23 y.o. Gender: male  CodeStatus: Full Code  Allergies: No Known Allergies    Chief Complaint:No chief complaint on file. Primary Care Provider: Zan Mcarthur MD  InpatientTreatment Team: Treatment Team: Attending Provider: Elli Mariscal DO; Consulting Physician: Elvira Ho MD; Consulting Physician: Alejandra Wagner MD; Consulting Physician: Shade Chavis, PhD; Consulting Physician: Lola Dominguez DO; Registered Nurse: Ervin Peterson RN; Occupational Therapist Assistant: KRISTINA Augustine; : Ottoniel Calhoun, MSW, LSW  Admission Date: 4/27/2023      HPI   Pt seen and examined on rehab unit for neurology follow-up. Currently alert and oriented x3, no acute distress, cooperative. Patient denies headache. Has had intermittent blurred vision since stroke. No diplopia. No dysphagia, aphasia or dysarthria. No one-sided weakness. Ongoing ataxia and disequilibrium noted. Denies dizziness. Afebrile. Blood pressure stable. No seizure activity reported. Vitals:    05/08/23 0819   BP: (!) 142/82   Pulse: (!) 108   Resp: 19   Temp: 98.4 °F (36.9 °C)   SpO2: 97%        Review of Systems   Constitutional:  Negative for appetite change and fever. HENT:  Negative for hearing loss and trouble swallowing. Eyes:  Positive for visual disturbance. Respiratory:  Negative for cough, chest tightness, shortness of breath and wheezing. Cardiovascular:  Negative for chest pain, palpitations and leg swelling. Gastrointestinal:  Negative for abdominal distention, abdominal pain, nausea and vomiting. Musculoskeletal:  Positive for gait problem. Skin:  Negative for color change and rash. Neurological:  Negative for dizziness, tremors, seizures, syncope, facial asymmetry, speech difficulty, weakness, light-headedness, numbness and headaches.    Psychiatric/Behavioral:  Negative for agitation, confusion and
Nutrition Assessment     Type and Reason for Visit: Reassess    Nutrition Recommendations/Plan:   Recommend Cardiac diet restrictions     Malnutrition Assessment:  Malnutrition Status: No malnutrition    Nutrition Assessment:  Pt remains stable from a nutritional standpoint, with verbalized continued good appetite/intake currently and pta, with no nutritional complaints. Nutrition Related Findings:   Pt admitted for rehab s/p CVA. PMH- childhood medulloblastoma and development toe disorder. Meds reviewed. +1 Gen, BUE & BLE edema noted. BSE 4/28-regular consistancy. Last BM noted 5/10. Wound Type: None    Current Nutrition Therapies:    ADULT DIET;  Regular (%)    Anthropometric Measures:  Height: 5' (152.4 cm)  Current Body Wt: 133 lb 11.2 oz (60.6 kg) (5/9)   BMI: 26.1    Nutrition Diagnosis:   No nutrition diagnosis at this time     Nutrition Interventions:   Food and/or Nutrient Delivery: Modify Current Diet (Recommend Cardiac diet restrictions)  Nutrition Education/Counseling: No recommendation at this time  Coordination of Nutrition Care: Continue to monitor while inpatient       Goals:     Goals: PO intake 75% or greater       Nutrition Monitoring and Evaluation:      Food/Nutrient Intake Outcomes: Food and Nutrient Intake  Physical Signs/Symptoms Outcomes: Weight    Discharge Planning:     No discharge needs at this time     Alayna Abrams, RD, LD
OCCUPATIONAL THERAPY  INPATIENT REHAB TREATMENT NOTE  [facility]      NAME: Shepherdnora Miller  : 2000 (23 y.o.)  MRN: [id number]  CODE STATUS: Full Code  Room: T096/G698-60    Date of Service: 2023    Referring Physician: Dr Wendie Wallace  Rehab Diagnosis: Imp Mob and ADLs due to CVA    Restrictions  Restrictions/Precautions  Restrictions/Precautions: Fall Risk          Patient's date of birth confirmed: Yes    SAFETY:  Safety Devices  Type of devices: All fall risk precautions in place    SUBJECTIVE:     Pain at start of treatment: No    Pain at end of treatment: No    OBJECTIVE:    While seated, completed fine motor task with focus on coordination, activity tolerance, and strength in order to improve general function. Patient instructed to assemble 16 piece blocks and bolts design following visual design. Patient with no difficulty inserting bolts into blocks. Patient with no difficulty threading wing nuts onto bolts. Patient assembled 16 pieces with 16 pieces being assembled correctly. Patient noted to require 0 verbal cues throughout activity. 100% accuracy    Patient with rest breaks as needed. While seated, challenged strength, activity tolerance, ROM, and flexibility for improved ADL performance. Challenged pt through usage of 2# weight to complete BUE exercises. 2 sets x 10 reps completed. Exercises focused on all UE joints and planes of motion including scapular protraction/retraction, shoulder flexion/extension/rotation/horizontal abduction, elbow flexion/extension, supination/pronation, and wrist/digit flexion/extension. Good participation. Assist needed to maintain rep count     Education:  Education  Education Given To: Patient  Education Provided: Plan of Care;Role of Therapy; Safety;Precautions  Education Method: Verbal  Education Outcome: Verbalized understanding    ASSESSMENT:  Activity Tolerance: Patient tolerated treatment well.  Pt with good participation- wanting to address
OCCUPATIONAL THERAPY  INPATIENT REHAB TREATMENT NOTE  [facility] Look      NAME: Orville Alvarado  : 2000 (23 y.o.)  MRN: [id number]  CODE STATUS: Full Code  Room: L290/A004-65    Date of Service: 5/10/2023    Referring Physician: Dr Luise Gosselin Diagnosis: Imp Mob and ADLs due to CVA    Restrictions  Restrictions/Precautions  Restrictions/Precautions: Fall Risk              Patient's date of birth confirmed: Yes    SAFETY:  Safety Devices  Safety Devices in place: Yes  Type of devices: All fall risk precautions in place    SUBJECTIVE:  Subjective: \" its a little hard to  looking at this way. \"    Pain at start of treatment: No    Pain at end of treatment: No    COGNITION:  Orientation  Overall Orientation Status: Within Normal Limits  Orientation Level: Oriented to place;Oriented to time;Oriented to person;Oriented to situation  Cognition  Overall Cognitive Status: WFL          OBJECTIVE:     Patient engaged in task requiring replicating a design consisting of blocks and bolts with wing nuts. Provided diagram at midline in front of pt of what picture to complete with blocks and bolts. Pt completed task in standing with SBA. Moderate to Maximum complexity of design was provided. Pt was able to follow diagram without verbal cues and assistance. Pt demo'd no grabbing and manipulating blocks, but min difficulty threading wing nuts onto bolts with dropping them at times. Pt stood x ~ 7 min to complete this task. Pt completed task to improve standing tolerance, balance and coordination to continue improving functional task performance skills. Provided pt with state puzzle x 50 pieces seated at table top. Provided Min vc's prn to assist with where a couple of the states go. Pt used 1/2# wrist wts. Pt completed task with little to no difficulty with picking up,manipulating and placing puzzle pieces. .   Pt reached in multi-direction with good efficiency to complete task.   Pt demo Fair fine
OCCUPATIONAL THERAPY  INPATIENT REHAB TREATMENT NOTE  [facility]      NAME: Willi Correa  : 2000 (23 y.o.)  MRN: [id number]  CODE STATUS: Full Code  Room: U807/F927-80    Date of Service: 2023    Referring Physician: Dr Sangita Jimenez Diagnosis: Imp Mob and ADLs due to CVA    Restrictions  Restrictions/Precautions  Restrictions/Precautions: Fall Risk     Patient's date of birth confirmed: Yes    SAFETY:  Safety Devices  Safety Devices in place: Yes  Type of devices: All fall risk precautions in place    SUBJECTIVE: \"I don't remember. \" - activity completed in a.m. OT session    Pain at start of treatment: No    Pain at end of treatment: No    COGNITION:  Orientation  Overall Orientation Status: Within Functional Limits  Cognition  Overall Cognitive Status: WFL    OBJECTIVE:    Grooming/Oral Hygiene  Assistance Level: Modified independent  Toileting  Assistance Level: Modified independent  Toilet Transfers  Technique: Stand step  Equipment: [employer] safety frame  Assistance Level: Supervision  Skilled Clinical Factors: w/c level    Peg Activity:  Patient engaged in B UE ROM/strengthening, B FM coordination and cognition to increase I with ADL's, IADL's and transfers. Patient donned B 1 # wrist weights. Patient able to reach in lateral planes with 0 difficulty. Patient able to reach in forward planes with MIN difficulty. Patient able to  100 large mushroom pegs with 0 difficulty 1 at a time. Patient able to sort pegs by color with 0 errors. Patient able to place large pegs 1 at a time in pegboard with 0 difficulty. Patient able to create pattern with pegs with MIN difficulty. .   Patient able to  50 marbles with R FM MIN difficulty 1 at a time. Patient able to  50 pennies with L FM MIN difficulty 1 at a time. Patient able to follow AB pattern with MIN verbal cues while placing marbles and pennies on large pegs.   Patient with 0 difficulty placing marbles on large pegs
OCCUPATIONAL THERAPY  INPATIENT REHAB TREATMENT NOTE  [facility] Look      NAME: Orville Alvarado  : 2000 (23 y.o.)  MRN: [id number]  CODE STATUS: Full Code  Room: H446/Y734-68    Date of Service: 2023    Referring Physician: Dr Luise Gosselin Diagnosis: Imp Mob and ADLs due to CVA    Restrictions:Fall                 Patient's date of birth confirmed: Yes    SAFETY:  Safety Devices  Safety Devices in place: Yes  Type of devices: All fall risk precautions in place    SUBJECTIVE:\"My back is getting tired. \"       Pain at start of treatment: No    Pain at end of treatment: No    Location:   Description   Nursing notified: Not Applicable  RN:   Intervention: Repositioned    COGNITION:         Patient's cognition will improve or maintain baseline to safely perform ADLs:    OBJECTIVE:     Pt completed acts at table top to increase stand tolerance and balance, UE strength and  [address] [place] for task completion. Pt with 2# cuff weights bilaterally completed placing marbles on 100 hole peg board with use of green graded clothespin to increase pinch strength. Pt alternated hands to complete tasks. Pt completed acts with two seated rest breaks required to finish. Pt completed removal of marbles by hand individually to get five in hand prior to placing in container. Pt completed removal marbles in standing position without seated rest breaks. Pt required increased time for all task completion. Education:       Equipment recommendations:         ASSESSMENT:Pt making gains to increase function to increase independence with ADL for return to home.          PLAN OF CARE:  Strengthening, Balance training, Functional mobility training, Endurance training, Neuromuscular re-education, Equipment evaluation, education, & procurement, Patient/Caregiver education & training, Self-Care / ADL, Sensory integration, Coordination training, Home management training, Safety education & training  continue with OT plan of
OCCUPATIONAL THERAPY  INPATIENT REHAB TREATMENT NOTE  [facility]      NAME: Charlann Oppenheim  : 2000 (23 y.o.)  MRN: [id number]  CODE STATUS: Full Code  Room: O528/O233-93    Date of Service: 2023    Referring Physician: Dr Vicente Zamorano Diagnosis: Imp Mob and ADLs due to CVA    Restrictions:Fall                 Patient's date of birth confirmed: Yes    SAFETY:  Safety Devices  Safety Devices in place: Yes  Type of devices: All fall risk precautions in place    SUBJECTIVE:Pt pleasant and cooperative. Pain at start of treatment: No    Pain at end of treatment: No    Location:   Description   Nursing notified: Not Applicable  RN:   Intervention: Repositioned    COGNITION:         Patient's cognition will improve or maintain baseline to safely perform ADLs:    OBJECTIVE:         Grooming/Oral Hygiene  Assistance Level: Modified independent  Upper Extremity Bathing  Assistance Level: Modified independent  Lower Extremity Bathing  Assistance Level: Modified independent  Upper Extremity Dressing  Assistance Level: Modified independent  Putting On/Taking Off Footwear  Assistance Level: Modified independent  Toilet Transfers  Equipment: Grab bars  Assistance Level: Modified independent  Tub/Shower Transfers  Type: Shower  Transfer To: Shower chair with back  Additional Factors: With handrails  Assistance Level: Modified independent     Pt completed homemaking tasks without use of walker with SBA secondary to balance. Pt completed bed making by stripping bed of dirty sheets and making bed with new sheets and pillow cases. Pt completed folding blankets, emptying urinal and placing linens into laundry bag. Pt required increased time with occasional seated rest break secondary to fatigue.             Education:       Equipment recommendations:         ASSESSMENT:Pt has increased independence with ADL and homemaking tasks         PLAN OF CARE:  Strengthening, Balance training, Functional mobility
OCCUPATIONAL THERAPY  INPATIENT REHAB TREATMENT NOTE  [facility]      NAME: Mendoza Ruth  : 2000 (23 y.o.)  MRN: [id number]  CODE STATUS: Full Code  Room: C814/X902-52    Date of Service: 2023    Referring Physician: Dr Declan Berger Diagnosis: Imp Mob and ADLs due to CVA    Restrictions  Restrictions/Precautions  Restrictions/Precautions: Fall Risk              Patient's date of birth confirmed: Yes    SAFETY:  Safety Devices  Safety Devices in place: Yes  Type of devices: All fall risk precautions in place    SUBJECTIVE:       Pain at start of treatment: No    Pain at end of treatment: No      COGNITION:  Orientation  Overall Orientation Status: Within Functional Limits  Cognition  Overall Cognitive Status: WFL      OBJECTIVE:     Patient was seen for shower ADL. Please see status below    Grooming/Oral Hygiene  Assistance Level: Modified independent  Upper Extremity Bathing  Assistance Level: Modified independent  Lower Extremity Bathing  Assistance Level: Modified independent  Upper Extremity Dressing  Assistance Level: Independent  Lower Extremity Dressing  Assistance Level: Modified independent  Putting On/Taking Off Footwear  Assistance Level: Modified independent  Toileting  Skilled Clinical Factors: did not need to go  Toilet Transfers  Skilled Clinical Factors: did not need to go  Tub/Shower Transfers  Type: Shower  Transfer From:  (no device)  Transfer To: Shower chair with back  Assistance Level: Supervision         Functional Mobility  Activity: To/From bathroom; Retrieve items;Transport items  Assistance Level: Supervision  Skilled Clinical Factors: Patient requested to complete mobility without a device due to wanting to challenge himself.  Patient was unsteady when walking to the closet but completed it without physical assist. patient was able to reach into the closet and gather items  Sit to Stand  Assistance Level: Modified independent  Stand to Sit  Assistance Level: Modified
OCCUPATIONAL THERAPY  INPATIENT REHAB TREATMENT NOTE  [facility]      NAME: Terra Payment  : 2000 (23 y.o.)  MRN: [id number]  CODE STATUS: Full Code  Room: U485/G758-84    Date of Service: 2023    Referring Physician: Dr Shelley Riley Diagnosis: Imp Mob and ADLs due to CVA    Restrictions  Restrictions/Precautions  Restrictions/Precautions: Fall Risk              Patient's date of birth confirmed: Yes    SAFETY:  Safety Devices  Safety Devices in place: Yes  Type of devices: All fall risk precautions in place    SUBJECTIVE:  Subjective: Patient reported that he would close the bathroom door when he went to the bathroom if he was able to be independent in the room    Pain at start of treatment: No    Pain at end of treatment: No      COGNITION:     WFL for the session    OBJECTIVE:     Patient was assessed for OT portion of independent in the room. Patient was able to move the over the bed table and retrieve his ww independently. Patient ambulated to the bathroom using the ww with no assistance. He completed toilet transfer with no assistance and walked to the sink to wash his hands independently. Patient was then asked to close the bathroom door as he would if he went to the bathroom. Patient struggled with finding where to stand with the ww while moving the door. Patient was able to close the door but with some unsteadiness. Patient was then told that if the bathroom door was too difficult for him to do then he could close the room door and the curtain. Patient was able to do so but had difficulty with quick change of directions to reach from one to the other. Therapist's verbal cues may have decreased patient's ability to concentrate on what he was doing. Patient had no LOB. Patient completed kitchen mobility using a rollator.  Patient was able to gather cones from different surfaces including the counter, top of the refrigerator, on a chair that was pushed under the table and from the
OCCUPATIONAL THERAPY  INPATIENT REHAB TREATMENT NOTE  [facility]      NAME: Mitch West  : 2000 (23 y.o.)  MRN: [id number]  CODE STATUS: Full Code  Room: Q364/Q505-42    Date of Service: 2023    Referring Physician: Dr Bigg Lubin Diagnosis: Imp Mob and ADLs due to CVA    Restrictions  Restrictions/Precautions  Restrictions/Precautions: Fall Risk     Patient's date of birth confirmed: Yes    SAFETY:  Safety Devices  Safety Devices in place: Yes  Type of devices: All fall risk precautions in place    SUBJECTIVE: \"I used to do origami. \"    Pain at start of treatment: No    Pain at end of treatment: No    COGNITION:  Orientation  Overall Orientation Status: Within Functional Limits  Cognition  Overall Cognitive Status: WFL    OBJECTIVE:    FM Coordination and Strengthening  Patient engaged in FM coordination and strengthening activities. Patient provided with a flattened tissue paper flower to open. Patient with MOD difficulty opening plastic package containing tissue paper flower. Patient with MIN difficulty reading written instructions. Patient provided demonstration of peeling layers of tissue to create 3D tissue paper flower. Patient with MAX difficulty peeling tissue layers apart. Patient with MOD difficulty pulling up tissue paper. Per patient request, patient provided with colored pencils to continue to work on coloring page that patient started in a.m. Patient able to retrieve specific colored pencils from banded group and from package with MOD difficulty. Patient able to grasp colored pencil with 0 difficulty. Patient with 0 difficulty coloring the picture on coloring page. ASSESSMENT: Patient occasionally in conversation while working at steady pace throughout session.     Activity Tolerance: Patient tolerated treatment well      PLAN OF CARE:  Strengthening, Balance training, Functional mobility training, Endurance training, Neuromuscular re-education, Equipment
OCCUPATIONAL THERAPY  INPATIENT REHAB TREATMENT NOTE  [facility]      NAME: Yoana Bernstein  : 2000 (23 y.o.)  MRN: [id number]  CODE STATUS: Full Code  Room: W299/X299-55    Date of Service: 5/10/2023    Referring Physician: Dr Chelo Crawley Diagnosis: Imp Mob and ADLs due to CVA    Restrictions:Fall                 Patient's date of birth confirmed: Yes    SAFETY:  Safety Devices  Safety Devices in place: Yes  Type of devices: All fall risk precautions in place    SUBJECTIVE:\"I can usually shave myself with the electric razor. \"  \"Don't worry that spot is healed. \"(Area on neck)       Pain at start of treatment: No    Pain at end of treatment: No    Location:   Description   Nursing notified: Not Applicable  RN:   Intervention: Repositioned    COGNITION:         Patient's cognition will improve or maintain baseline to safely perform ADLs:    OBJECTIVE:         Grooming/Oral Hygiene  Skilled Clinical Factors: Mod I oral care and grooming. Pt Mod A shaving  Upper Extremity Bathing  Assistance Level: Modified independent  Lower Extremity Bathing  Assistance Level: Modified independent  Upper Extremity Dressing  Assistance Level: Independent  Lower Extremity Dressing  Assistance Level: Independent  Putting On/Taking Off Footwear  Assistance Level: Modified independent  Toileting  Assistance Level: Modified independent  Toilet Transfers  Equipment: Grab bars  Assistance Level: Supervision  Tub/Shower Transfers  Type: Shower  Transfer To: Shower chair with back  Additional Factors: With handrails  Assistance Level: Supervision     Pt completed bed making with Min A to change pillow cases and fitted sheet on bed. Pt completed blanket placement with SBA/S standing to walk around bed. Pt completed item retrieval from closet with use of rollator and to/from bathroom with rollator.                Education:       Equipment recommendations:         ASSESSMENT:Pt making gains to increase independence with ADL and
Patient alert and oriented pleasant and cooperative denies any pain or discomfort at this time. Continent of bowl and bladder up as tolerated in room good safety awareness.
Physical Therapy  Facility/Department: [facility]  Rehabilitation Discharge note    NAME: Karyle Curt  : 2000  MRN: [id number]    Date of discharge: 23      Past Medical History:   Diagnosis Date    Childhood medulloblastoma ([facility] [address] 2010    Developmental disorder of scholastic skills, unspecified 2018     History reviewed. No pertinent surgical history. Restrictions  Restrictions/Precautions  Restrictions/Precautions: Fall Risk    Objective    Bed Mobility Training  Bed Mobility Training: Yes  Overall Level of Assistance: Modified independent  Rolling: Modified independent  Supine to Sit: Modified independent  Sit to Supine: Modified independent  Scooting: Modified independent     Transfer Training  Transfer Training: Yes  Overall Level of Assistance: Modified independent  Sit to Stand: Modified independent  Stand to Sit: Modified independent  Stand Pivot Transfers: Modified independent  Car Transfer: Modified independent     Gait Training: Yes  Overall Level of Assistance: Modified independent  Distance (ft): 200 Feet x 2 with seated rest before stair training. Assistive Device: Walker, rollator; Other (comment) (dons air cast to left foot.)  Interventions: Verbal cues  Base of Support: Widened  Speed/Dee: Fluctuations  Gait Abnormalities: Ataxic  Rail Use: Both  Right Side Weight Bearing: As tolerated  Left Side Weight Bearing: As tolerated  Uneven Terrain - Level of Assistance: Supervision;Modified independent     Stairs - Level of Assistance: Supervision;Modified independent  Number of Stairs Trained: 12             Outcomes Measures:  Linder Balance Score: 37       Pt/ family education/training: Pt aunt was present and observed pt s abilities. Pt was educated throughout his stay in safe mobility. He demonstrated good follow thru with instructions. Assessment:  Pt has made excellent gains. He has met goals established at this time.      LTG established:  Long Term Goal 1: Pt will
Physical Therapy Rehab Treatment Note  Facility/Department: [facility]  Room: Y237/B801-64       NAME: Mendoza Ruth  : 2000 (23 y.o.)  MRN: [id number]  CODE STATUS: Full Code    Date of Service: 2023     Restrictions:  Restrictions/Precautions: Fall Risk    SUBJECTIVE:      Pain  Pain: denies pain pre/post session    OBJECTIVE:         Bed mobility  Rolling to Left: Modified independent  Rolling to Right: Modified independent  Supine to Sit: Modified independent  Sit to Supine: Modified independent    Transfers  Sit to Stand: Supervision  Stand to Sit: Supervision  Mat>WC: independent    Ambulation  Surface: Level tile; Carpet  Device: Rollator  Assistance: Stand by assistance  Quality of Gait: ataxic, decreased B ankle stability, Narrow ADI, decreased R heel strike  Distance: 200ft  Comments: VCs for R heel strike        PT Exercises  A/AROM Exercises: supine bridges with feet on pball 2x10, SLR x20, s/l clams RTBx10, s/l hip abd x10, DKTC feet on pball x10           ASSESSMENT/PROGRESS TOWARDS GOALS: Focused treatment on core and hip strength. Goals:  Long Term Goals  Long Term Goal 1: Pt will perform bed mobility with indep  Long Term Goal 2: Pt will perform ambulation for 150ft with indep and appropriate UE support  Long Term Goal 3: Pt will perform transfers with indep  Long Term Goal 4: Pt will perform 4 stairs with indep  Long Term Goal 5: Pt will score 44/56 on Linder  Patient Goals   Patient Goals : I want to get back to where I am, walk better, feel better    PLAN OF CARE/Safety: Cont per POC.        Therapy Time:   Individual   Time In 1500   Time Out 1530   Minutes 30     Minutes:  Transfer/Bed mobility trainin  Gait training:10  Neuro re education:0  Therapeutic ex:15    [place] PTA, 23 at 3:56 PM
Physical Therapy Rehab Treatment Note  Facility/Department: [facility]  Room: H826/B836-65       NAME: Dharmesh Urena  : 2000 (23 y.o.)  MRN: [id number]  CODE STATUS: Full Code    Date of Service: 5/10/2023       Restrictions:  Restrictions/Precautions: Fall Risk     SUBJECTIVE: Pt states his L side has always been weaker. Pain   Pt denies pain. OBJECTIVE:         Bed mobility  Rolling to Left: Modified independent  Rolling to Right: Modified independent  Supine to Sit: Modified independent  Sit to Supine: Modified independent    Transfers  Sit to Stand: Modified independent  Stand to Sit: Modified independent  Bed to Chair: Modified independent    Ambulation  Surface: Level tile; Carpet  Device: Rollator  Assistance: Supervision  Quality of Gait: ataxic, decreased B ankle stability, decreased R heel strike  Distance: [id number]  Comments: Increased L lateral ankle stability noted. Trial'd gait training with ankle air cast with improved stability. Issued pt L ankle air cast.  Instructed pt on donning. Pt able to agnieszka with VCs. Stairs/Curb  Stairs?: Yes  Stairs  # Steps : 12  Stairs Height: 6\"  Rails: Bilateral  Assistance: Supervision                 ASSESSMENT/PROGRESS TOWARDS GOALS:   Assessment: Pt with increased L lateral ankle instability this PM.  Improved stability with air cast.  Issued pt air cast for gait. Goals:  Long Term Goals  Long Term Goal 1: Pt will perform bed mobility with indep  Long Term Goal 2: Pt will perform ambulation for 150ft with indep and appropriate UE support  Long Term Goal 3: Pt will perform transfers with indep  Long Term Goal 4: Pt will perform 4 stairs with indep  Long Term Goal 5: Pt will score 44/56 on Linder  Patient Goals   Patient Goals : I want to get back to where I am, walk better, feel better    PLAN OF CARE/Safety: Cont per POC.        Therapy Time:   Individual   Time In 1500   Time Out 1530   Minutes 30     Minutes:  Transfer/Bed mobility
Physical Therapy Rehab Treatment Note  Facility/Department: [facility]  Room: N166/G482-07       NAME: Elissa Perry  : 2000 (23 y.o.)  MRN: [id number]  CODE STATUS: Full Code    Date of Service: 2023  Family / Caregiver Present: Yes (pt's aunt observed)  Restrictions:  Restrictions/Precautions: Fall Risk     SUBJECTIVE:      Pain   Pt denies pain    OBJECTIVE:         Bed mobility  Rolling to Left: Modified independent  Rolling to Right: Modified independent  Supine to Sit: Modified independent  Sit to Supine: Modified independent    Transfers  Sit to Stand: Supervision  Stand to Sit: Supervision  Bed to Chair: Supervision  Car Transfer: Stand by assistance    Ambulation  Surface: Level tile; Carpet  Device: Rolling Walker;Rollator  Assistance: Stand by assistance  Quality of Gait: ataxic, decreased B ankle stability  Distance: 150ft    Gait training with st cane CGA    Stairs   4-6in   Avinash rails  SBA     PT Exercises  Resistive Exercises: standing 3way hip with RTB x10 B; lateral gait drills RTB R and L  Static Standing Balance Exercises: standing on foam 46sqic4 SBA; SLS 10sec x3 B with intermittent use of UEs to correct            ASSESSMENT/PROGRESS TOWARDS GOALS: Pt's aunt expresses concerns about pt's walking and D/Cing to home to soon on . States she would like pt to stay longer. Pt has met bed mobility goal.  Pt is progressing towards all other goals. Pt cont to be challenged in SLS and dynamic balance without E support. Gait deviations increase with increase distance and fatigue.           Goals:  Long Term Goals  Long Term Goal 1: Pt will perform bed mobility with indep  Long Term Goal 2: Pt will perform ambulation for 150ft with indep and appropriate UE support  Long Term Goal 3: Pt will perform transfers with indep  Long Term Goal 4: Pt will perform 4 stairs with indep  Long Term Goal 5: Pt will score 44/56 on Linder  Patient Goals   Patient Goals : I want to get back to where I am, walk
Physical Therapy Rehab Treatment Note  Facility/Department: [facility]  Room: [facility]/O250-61       NAME: Norm Bello  : 2000 (23 y.o.)  MRN: [id number]  CODE STATUS: Full Code    Date of Service: 2023       Restrictions:pt is now independent in the room. SUBJECTIVE: \"I'm so glad to be going home tomorrow\"       Pain   denies      OBJECTIVE:             Bed Mobility Training  Bed Mobility Training: Yes  Overall Level of Assistance: Modified independent         Gait Training: Yes  Overall Level of Assistance: Modified independent  Distance (ft): 300 Feet  Assistive Device: Walker, rollator  Gait Abnormalities: Ataxic  Rail Use: Both  Right Side Weight Bearing: As tolerated  Left Side Weight Bearing: As tolerated  Uneven Terrain - Level of Assistance: Modified independent    Stairs - Level of Assistance: Modified independent  Number of Stairs Trained: 12         Neuromuscular Education  Neuromuscular Comments: cheatham related tasks in room. pt able to improved 360 turn time to under 4 seconds. ASSESSMENT/PROGRESS TOWARDS GOALS: pt has met all of his goals.         Goals:  Long Term Goals  Long Term Goal 1: Pt will perform bed mobility with indep-met   Long Term Goal 2: Pt will perform ambulation for 150ft with indep and appropriate UE support-met   Long Term Goal 3: Pt will perform transfers with indep-met   Long Term Goal 4: Pt will perform 4 stairs with indep-met   Long Term Goal 5: Pt will score 44/56 on Cheatham- met   Patient Goals   Patient Goals : I want to get back to where I am, walk better, feel better    PLAN OF CARE/Safety: ongoing          Therapy Time:   Individual   Time In 0900   Time Out 0930   Minutes 30     Minutes:30  Transfer/Bed mobility trainin  Gait trainin  Neuro re education:5  Therapeutic ex:0      Franco Malone PTA, 23 at 5:17 PM
Physical Therapy Rehab Treatment Note  [facility]       NAME: Moiz Segovia  : 2000 (23 y.o.)  MRN: [id number]  CODE STATUS: Full Code    Date of Service: 2023       Restrictions:ongoing           SUBJECTIVE:   Subjective: \"I\"m going to a bbq tomorow\"    Pain  Pain: denies pain pre/post session      OBJECTIVE:   Orientation  Overall Orientation Status: Within Normal Limits         Bed Mobility Training  Bed Mobility Training: Yes  Overall Level of Assistance: Modified independent    Transfer Training  Transfer Training: Yes  Overall Level of Assistance: Modified independent  Car Transfer: Modified independent    Gait Training: Yes  Overall Level of Assistance: Modified independent  Distance (ft): 300 Feet x 2  Assistive Device: Walker, rollator  Interventions: Verbal cues  Base of Support: Widened  Speed/Dee: Fluctuations  Gait Abnormalities: Ataxic  Rail Use: Both  Right Side Weight Bearing: As tolerated  Left Side Weight Bearing: As tolerated  Uneven Terrain - Level of Assistance: Modified independent  Pt able to ambulate on outdoor terrain without lob. Able to  when to turn and made adjustments for changes in terrain. Stairs - Level of Assistance: Modified independent  Number of Stairs Trained: 12         Neuromuscular Education  Neuromuscular Comments: cheatham related tasks in room. pt able to improved 360 turn time to under 4 seconds. Standing and bounce/catch weighted ball. Weight shifting while holding ball. Pt holding slideboard horizontal and ambulating with it. Added different sized balls (one at a time) to see if he could manage ambulating with it on it. Pt able to go 20 feet before losing ball. Patient Education  Education Given To: Patient  Education Provided: Home Exercise Program  Education Method: Printed Information/Hand-outs; Verbal  Barriers to Learning: None        ASSESSMENT/PROGRESS TOWARDS GOALS: pt has met all of his
Physical Therapy Rehab Treatment Note  Facility/Department: [facility]  Room: B628/K867-10       NAME: Evangelist Burr  : 2000 (23 y.o.)  MRN: [id number]  CODE STATUS: Full Code    Date of Service: 2023     Restrictions:  Restrictions/Precautions: Fall Risk    SUBJECTIVE:      Pain  Pain: denies pain pre/post session    OBJECTIVE:            Transfers  Sit to Stand: Supervision  Stand to Sit: Supervision  Car Transfer: Supervision  Comment: Instructed pt on transfering to sit on rollator. Pt completed with supervision. Ambulation  Surface: Level tile; Carpet  Device: Rollator  Assistance: Stand by assistance  Quality of Gait: ataxic, decreased B ankle stability, Narrow ADI, decreased R heel strike  Distance: 337pgr0; multiple short distances  Comments: VCs for R heel strike    Stairs/Curb  Stairs?: Yes  Stairs  # Steps : 12  Stairs Height: 6\"  Rails: Bilateral  Assistance: Stand by assistance  Comment: Recip pattern        PT Exercises  Dynamic Standing Balance Exercises: Scavenger hunt around PT gym retreiving cones and bean bags with rollator. Transporting items on rollator. SBA. Standing Open/Closed Kinetic Chain Exercises: heel raise and march x20 ea            ASSESSMENT/PROGRESS TOWARDS GOALS: Pt with improved balance and tolerating increased distance with rollator. Pt also demonstrated ability to transport and transfer to sit on rollator when fatigued. Pt cont to have decreased ankle stability requiring rollator for safest ambulation.          Goals:  Long Term Goals  Long Term Goal 1: Pt will perform bed mobility with indep  Long Term Goal 2: Pt will perform ambulation for 150ft with indep and appropriate UE support  Long Term Goal 3: Pt will perform transfers with indep  Long Term Goal 4: Pt will perform 4 stairs with indep  Long Term Goal 5: Pt will score 44/56 on Linder  Patient Goals   Patient Goals : I want to get back to where I am, walk better, feel better    PLAN OF CARE/Safety: Cont pre
Physical Therapy Rehab Treatment Note  [facility]  Room: I964/B100-12       NAME: Hay Miller  : 2000 (23 y.o.)  MRN: [id number]  CODE STATUS: Full Code    Date of Service: 2023       Restrictions:fall risk           SUBJECTIVE:   Subjective: \"I'm looking forward to going home today\"    Pain  Pain: denies pain pre/post session      OBJECTIVE:   Orientation  Overall Orientation Status: Within Normal Limits  Cognition  Overall Cognitive Status: WNL         Bed Mobility Training  Bed Mobility Training: Yes  Overall Level of Assistance: Modified independent  Rolling: Modified independent  Supine to Sit: Modified independent  Sit to Supine: Modified independent  Scooting: Modified independent    Transfer Training  Transfer Training: Yes  Overall Level of Assistance: Modified independent  Sit to Stand: Modified independent  Stand to Sit: Modified independent  Stand Pivot Transfers: Modified independent  Toilet Transfer: Modified independent  Car Transfer: Modified independent    Overall Level of Assistance: Modified independent  Assistive Device: Walker, rollator  Base of Support: Widened  Speed/Dee: Fluctuations  Step Length: Left shortened  Stance: Left decreased  Gait Abnormalities: Ataxic  Rail Use: Both  Right Side Weight Bearing: As tolerated  Left Side Weight Bearing: As tolerated    Stairs - Level of Assistance: Modified independent                              Patient Education  Education Given To: Patient  Education Provided: Home Exercise Program  Education Method: Printed Information/Hand-outs; Verbal  Barriers to Learning: None        ASSESSMENT/PROGRESS TOWARDS GOALS: all goals met. Trialed ambulation with cane and without assistive device. Pt safest with rollator at this time.         Goals:  Long Term Goals  Long Term Goal 1: Pt will perform bed mobility with indep-met   Long Term Goal 2: Pt will perform ambulation for 150ft with indep and appropriate UE support-met
Physical Therapy Rehab Treatment Note  Facility/Department: [facility]  Room: S793/S071-63       NAME: Yoana Bernstein  : 2000 (22 y.o.)  MRN: [id number]  CODE STATUS: Full Code    Date of Service: 5/10/2023       Restrictions:  Restrictions/Precautions: Fall Risk     SUBJECTIVE:      Pain   Pt denies pain pre and post.      OBJECTIVE:         Bed mobility  Rolling to Left: Modified independent  Rolling to Right: Modified independent  Supine to Sit: Modified independent  Sit to Supine: Modified independent    Transfers  Sit to Stand: Modified independent  Stand to Sit: Modified independent  Bed to Chair: Modified independent    Ambulation  Surface: Level tile; Carpet  Device: Rollator  Assistance: Supervision  Quality of Gait: ataxic, decreased B ankle stability, decreased R heel strike  Distance: 200ft  Comments: VCs for R heel strike           PT Exercises  Exercise Treatment: seated LAQ and march with 4# 2x10 ea  Resistive Exercises: gait with rollator against GTB resistance 75ft  Static Standing Balance Exercises: SLS 10sec x3 B with intermittent with 2 finger tip support; Narrow ADI 60sec, modified tamdem 30sec SBA, tandem stance min assist  Dynamic Standing Balance Exercises: alternating 4in step tap x10 without UE support SBA; 360degree turns R and L SBA  Standing Open/Closed Kinetic Chain Exercises: sink ex Enno.Oyster            ASSESSMENT/PROGRESS TOWARDS GOALS:   Assessment: Pt has met bed mob and transfer goal.  Pt is progressing towards all other goals. Gait safest with rollator.     Goals:  Long Term Goals  Long Term Goal 1: Pt will perform bed mobility with indep  Long Term Goal 2: Pt will perform ambulation for 150ft with indep and appropriate UE support  Long Term Goal 3: Pt will perform transfers with indep  Long Term Goal 4: Pt will perform 4 stairs with indep  Long Term Goal 5: Pt will score 44/56 on Linder  Patient Goals   Patient Goals : I want to get back to where I am, walk better, feel
Physical Therapy Rehab Treatment Note  Facility/Department: [facility]  Room: Q7/S286-96       NAME: Marii Franklin  : 2000 (23 y.o.)  MRN: [id number]  CODE STATUS: Full Code    Date of Service: 2023       Restrictions:fall risk          SUBJECTIVE:   Subjective: \"I am going home soon I'm glad\"    Pain  Pain: denies pain pre/post session      OBJECTIVE:   Orientation  Overall Orientation Status: Within Normal Limits  Cognition  Overall Cognitive Status: WNL         Bed Mobility Training  Bed Mobility Training: Yes  Overall Level of Assistance: Modified independent  Rolling: Modified independent  Supine to Sit: Modified independent  Sit to Supine: Modified independent  Scooting: Modified independent    Transfer Training  Transfer Training: Yes  Overall Level of Assistance: Modified independent  Sit to Stand: Modified independent  Stand to Sit: Modified independent  Stand Pivot Transfers: Modified independent  Car Transfer: Modified independent    Gait Training: Yes  Overall Level of Assistance: Modified independent  Distance (ft): 200 Feet x 2 with seated rest before stair training. Assistive Device: Walker, rollator; Other (comment) (dons air cast to left foot.)  Interventions: Verbal cues  Base of Support: Widened  Speed/Dee: Fluctuations  Gait Abnormalities: Ataxic  Rail Use: Both  Right Side Weight Bearing: As tolerated  Left Side Weight Bearing: As tolerated  Uneven Terrain - Level of Assistance: Supervision;Modified independent    Stairs - Level of Assistance: Supervision;Modified independent  Number of Stairs Trained: 12                                       ASSESSMENT/PROGRESS TOWARDS GOALS: patient has made significant improvements in his balance and trunk control which have allowed him to be able to ambulate further and safer.         Goals:  Long Term Goals  Long Term Goal 1: Pt will perform bed mobility with indep-met   Long Term Goal 2: Pt will perform ambulation for 150ft with indep
Progress Note    Date:5/13/2023       Room:R262/R262-01  Patient Scott Ivy     YOB: 2000     Age:22 y.o. Assessment        Hospital Problems             Last Modified POA    * (Principal) Impaired mobility and ADLs dt CVA 4/24/23 R krystle 4/28/2023 Yes    Overview Signed 4/28/2023  8:06 AM by Zoila Medina DO     24-year-old male presented to [facility] [place] on April 24, 2023 with acute onset right upper and lower extremity weakness and numbness. MRI revealed acute infarct in the left cerebellar PICA territory and left dorsal rubens. Developmental disorder of scholastic skills, unspecified 4/28/2023 Yes    Ataxia 4/29/2023 Yes    Other optic atrophy, bilateral 4/28/2023 Yes    Posterior subcapsular polar age-related cataract of both eyes 4/28/2023 Yes    Aphasia 4/28/2023 Yes    Dysphagia, oropharyngeal phase 4/28/2023 Yes    Cerebrovascular accident (CVA) due to stenosis of left vertebral artery ([address] 5/1/2023 Yes       Plan:      *Impaired mobility and activities of daily living due to CVA-Dr. Ruth Alvarado managing     *Acute CVA-followed by neurology; EVELIO is negative for intracardiac source for CVA, LVEF 55 -60% ; continue dual antiplatelet therapy aspirin, Plavix, and statin     [facility] managing acute needs. Patient will need to follow-up with PCP for chronic disease management. Time spent with patient 35 minutes. Greater than 70% of time  spent focused exclusively on this patient ,reviewing  chart,  reconciling medications, &  answering questions with patient and discussing plan. Subjective   Interval History Status: significantly improved. No complaints voiced. Patient denies chest pain, palpitations, headache, dizziness, shortness of breath, cough, fever, chills, N/V/D, and changes in appetite. Planned discharge for today.           Review of Systems   12 point review of systems reviewed with patient with pertinent positives listed in HPI otherwise
Pt assessment completed. Denies any pain. HS atrovastin given pt urinal emptied. Pt states see you in morning, ready for bed. Urinal emptied through HS. Pt still asleep. Call light with in reach.
Pt assessment completed. Pt denies pain. Assisted to bed from chair unsteady with W/W. Slept all night. Call light with in reach and bedside table in reach.
Pt in w/c in room, denies pain. Alert and pleasant, snack provided per pt request. Chair alarm activated, call light in reach.  Electronically signed by Cady Suarez RN on 5/10/2023 at 3:53 PM
Pt in w/c in room, denies pain. Independent in the room recommendation entered on team note for Nursing. Snack provided, call light in reach.  Electronically signed by Rey Masters RN on 5/11/2023 at 3:34 PM
Spiritual Care Services     Summary of Visit:  Follow up visit. Patient was resting in his wheel chair watching TV. He was calm and expressed that he felt he was doing pretty good. No expressed needs at this time. Patient is big Star Wars fan. Lots of Star Wars legos and a Star Wars T-shirt and Star Wars blanket on his bed. We engaged in a good Star Wars conversation. Patient expressed that he is not religous. Patient looking forward to going home. Expressed that he lives alone, but that his aunt and uncle are only 5 minutes away. No family present at the time of this visit. Patient has no AD on file at this time. Encounter Summary  Encounter Overview/Reason : Spiritual/Emotional Needs  Service Provided For[de-identified] Patient  Referral/Consult From[de-identified] Rounding  Support System: Family members  Last Encounter : 04/30/23  Complexity of Encounter: Low  Begin Time: 5127  End Time : 1313  Total Time Calculated: 16 min  Encounter   Type: Follow up     Spiritual/Emotional needs  Type: Other (comment)                      Spiritual Assessment/Intervention/Outcomes:    Assessment: Calm, Coping, Hopeful    Intervention: Explored/Affirmed feelings, thoughts, concerns, Nurtured Hope    Outcome: Engaged in conversation, Expressed feelings, needs, and concerns, Expressed Gratitude      Care Plan:    Plan and Referrals  Plan/Referrals: Continue Support (comment)    Continue to visit. Discuss AD. Spiritual Care Services   Electronically signed by Clyde Mann, [email or web address] on 5/9/2023 at 2:00 PM.    To reach a  for emotional and spiritual support, place an [facility] consult request.   If a  is needed immediately, dial 0 and ask to page the on-call .
Subjective: The patient complains of severe acute on chronic progressive fatigue and balance deficits partially relieved by rest, medications, PT,  OT,   SLP and rest and exacerbated by recent   CVAs. Patient is 23years old initially presented to [facility] [place] with acute onset right upper and lower extremity numbness tingling and weakness. He had had a recent episode the week prior though symptoms also included left thigh blurring and full facial numbness. 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 He was transported to [facility]. Work-up included the MRI demonstrating the CVA as above as well as a CT which revealed focus of encephalomalacia in the right frontal lobe and right frontal nahid hole there is also mild to moderate volume loss in the cerebellar region. Patient has a history of brain tumor as a child in the form of medulloblastoma. Other work-up included serial CBCs and BMPs which were unremarkable. 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 He is independent with all ADLs and will ability prior to his strokes. I am concerned about patients medical complexities and barriers to advancing in rehab goals including baseline mild cognitive deficits and ataxia. I reviewed current care and plans for further care with other rehab providers including nursing and case management. According to recent nursing note, \" Pt denies pain. Assisted to bed from chair unsteady with W/W. Slept all night. Call light with in reach and bedside table in reach. \". He is eager for DC. His mood is improving. ROS x10: The patient also complains of severely impaired mobility and activities of daily living.   Otherwise no new problems with vision, hearing, nose, mouth, throat, dermal, cardiovascular, GI, , pulmonary, musculoskeletal, psychiatric or neurological. See also Acute Rehab PM&R
Subjective: The patient complains of severe acute on chronic progressive fatigue and balance deficits partially relieved by rest, medications, PT,  OT,   SLP and rest and exacerbated by recent   CVAs. Patient is 23years old initially presented to [facility] [place] with acute onset right upper and lower extremity numbness tingling and weakness. He had had a recent episode the week prior though symptoms also included left thigh blurring and full facial numbness. 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 He was transported to [facility]. Work-up included the MRI demonstrating the CVA as above as well as a CT which revealed focus of encephalomalacia in the right frontal lobe and right frontal nahid hole there is also mild to moderate volume loss in the cerebellar region. Patient has a history of brain tumor as a child in the form of medulloblastoma. Other work-up included serial CBCs and BMPs which were unremarkable. 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 He is independent with all ADLs and will ability prior to his strokes. I am concerned about patients medical complexities and barriers to advancing in rehab goals including baseline mild cognitive deficits and ataxia. I reviewed current care and plans for further care with other rehab providers including nursing and case management. According to recent nursing note, \" Pt assessment completed. Denies any pain. HS atrovastin given pt urinal emptied. Pt states see you in morning, ready for bed. Urinal emptied through HS. Pt still asleep. Call light with in reach. \".    Patient is really hoping not to have to use the rolling walker at discharge.   We will continue to work toward that goal however I will provide alternative types of assistive devices that are essentially rolling walker but do not seem as associated with the elderly as a
Subjective: The patient complains of severe acute on chronic progressive fatigue and balance deficits partially relieved by rest, medications, PT,  OT,   SLP and rest and exacerbated by recent   CVAs. Patient is 23years old initially presented to [facility] [place] with acute onset right upper and lower extremity numbness tingling and weakness. He had had a recent episode the week prior though symptoms also included left thigh blurring and full facial numbness. 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 He was transported to [facility]. Work-up included the MRI demonstrating the CVA as above as well as a CT which revealed focus of encephalomalacia in the right frontal lobe and right frontal nahid hole there is also mild to moderate volume loss in the cerebellar region. Patient has a history of brain tumor as a child in the form of medulloblastoma. Other work-up included serial CBCs and BMPs which were unremarkable. 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 He is independent with all ADLs and will ability prior to his strokes. I am concerned about patients medical complexities and barriers to advancing in rehab goals including baseline mild cognitive deficits and ataxia. I reviewed current care and plans for further care with other rehab providers including nursing and case management. According to recent nursing note, \" VSS. Denied pain. LBM 5/9. No voiced complaints or needs. No distress noted. Call light within reach and bed alarm activated. .\".    I am glad to see that he is benefiting from spiritual care. His spirits remain high despite his long hospital stay. He is looking forward to discharge this weekend and going to a party. ROS x10: The patient also complains of severely impaired mobility and activities of daily living.   Otherwise no new problems with vision, hearing,
ASSESSMENT/PROGRESS TOWARDS GOALS: see below       Goals:  Long Term Goals  Long Term Goal 1: Pt will perform bed mobility with indep-met   Long Term Goal 2: Pt will perform ambulation for 150ft with indep and appropriate UE support-met   Long Term Goal 3: Pt will perform transfers with indep-met   Long Term Goal 4: Pt will perform 4 stairs with indep-met   Long Term Goal 5: Pt will score 44/56 on Linder-pt 43/56 this date   Patient Goals   Patient Goals : I want to get back to where I am, walk better, feel better    PLAN OF CARE/Safety: ongoing          Therapy Time:   Individual   Time In 1500   Time Out 1600   Minutes 60     Minutes:60  Transfer/Bed mobility trainin  Gait trainin  Neuro re education:20  Therapeutic ex:0      Mirza Pollard PTA, 23 at 3:35 PM
CARE:  Strengthening, Balance training, Functional mobility training, Endurance training, Neuromuscular re-education, Equipment evaluation, education, & procurement, Patient/Caregiver education & training, Self-Care / ADL, Sensory integration, Coordination training, Home management training, Safety education & training  continue with OT plan of care    Patient goals : \"hopefully to be able to live on my own again\"  Time Frame for Long Term Goals :  Within 2 weeks patient to demonstrate progress in the following areas in order to meet long terms goals located in the initial evaluation  Long Term Goal 1: improve sensation of right hand  Long Term Goal 2: improve coordination to increase functional use of the right hand  Long Term Goal 3: increase balance  Long Term Goal 4: increase self care independence        Therapy Time:   Individual Group Co-Treat   Time In 1300       Time Out 1400         Minutes 60                   ADL/IADL training: 10 minutes  Therapeutic activities: 50 minutes     Electronically signed by:    Usama Hendricks OT,   5/12/2023, 2:29 PM
training:15  Neuro re education:5  Therapeutic ex:15    Osjose Cornea, PTA, 05/08/23 at 4:38 PM
with patient with pertinent positives listed in HPI otherwise ROS negative    Medications   Scheduled Meds:    Vitamin D  2,000 Units Oral Dinner    cyanocobalamin  1,000 mcg IntraMUSCular Weekly    coenzyme Q10  100 mg Oral Daily    aspirin  81 mg Oral Daily    atorvastatin  40 mg Oral Nightly    clopidogrel  75 mg Oral Daily    vitamin B-12  500 mcg Oral Daily    folic acid  1 mg Oral Daily    vitamin B-6  25 mg Oral Daily     Continuous Infusions:    sodium chloride       PRN Meds: lidocaine, sodium chloride flush, sodium chloride, acetaminophen, bisacodyl, sodium phosphate, melatonin    Past History    Past Medical History:   has a past medical history of Childhood medulloblastoma ([address] and Developmental disorder of scholastic skills, unspecified. Social History:   reports that he does not drink alcohol and does not use drugs. Family History: History reviewed. No pertinent family history. Physical Examination      Vitals:  /73   Pulse (!) 103   Temp 98.1 °F (36.7 °C) (Oral)   Resp 18   Ht 5' (1.524 m)   Wt 133 lb 11.2 oz (60.6 kg)   SpO2 98%   BMI 26.11 kg/m²   Temp (24hrs), Av °F (36.7 °C), Min:97.8 °F (36.6 °C), Max:98.1 °F (36.7 °C)      I/O (24Hr): No intake or output data in the 24 hours ending 05/10/23 0939      CONSTITUTIONAL:  Awake, alert, no apparent distress, and appears stated age  EYES: pupils equal, round and reactive to light   NECK:  Supple   LUNGS:  No increased work of breathing, good air exchange, clear to auscultation bilaterally, no crackles or wheezing  CARDIOVASCULAR:  Normal apical impulse, regular rate and rhythm  ABDOMEN:  No scars, normal bowel sounds, soft, non-distended, non-tender  MUSCULOSKELETAL:  There is no redness, warmth, or swelling of the joints. Full range of motion noted  NEUROLOGIC:  Awake, alert.   No focal deficits noted  SKIN:  No bruising or bleeding, normal skin color, texture, turgor, no redness, warmth, or swelling, no rashes, and no
1256)  Equipment Used: Standard bedside commode (04/28/23 1256)  Toilet Transfer: Minimal assistance (04/28/23 1256)     Shower Transfers  Shower - Transfer From: [place] (04/28/23 1256)  Shower - Transfer Type: To and From (04/28/23 1256)  Shower - Transfer To: Shower seat with back (04/28/23 1256)  Shower - Technique: Ambulating (04/28/23 1256)  Shower Transfers: Moderate assistance (04/28/23 1256)    Speech Therapy:      Comprehension: Within Functional Limits [facility] with following multi step directions and answering yes/no questions)  Verbal Expression: Exceptions to functional limits  Diet/Swallow:        Dysphagia Outcome Severity Scale: Level 7: Normal in all situations             Lab/X-ray studies reviewed, analyzed and discussed with patient and staff:     5/1/23TEE   was negative      No results found for this or any previous visit (from the past 24 hour(s)). No results found. Previous extensive, complex labs, notes and diagnostics reviewed and analyzed. ALLERGIES:    Allergies as of 04/27/2023    (No Known Allergies)      (please also verify by checking MAR)        Complex Physical Medicine & Rehab Issues Assess & Plan:   Severe abnormality of gait and mobility and impaired self-care and ADL's secondary to  acute CVA-left rubens and left cerebellum. .  Functional and medical status reassessed regarding patients ability to participate in therapies and patient found to be able to participate in acute intensive comprehensive inpatient rehabilitation program including PT/OT to improve balance, ambulation, ADLs, and to improve the P/AROM. Therapeutic modifications regarding activities in therapies, place, amount of time per day and intensity of therapy made daily. In bed therapies or bedside therapies prn. Bowel and Bladder dysfunction, Neurogenic bowel and bladder:  frequent toileting, ambulate to bathroom with assistance, check post void residuals.   Check for C.difficile x1 if >2 loose stools
Assess & Plan:   Severe abnormality of gait and mobility and impaired self-care and ADL's secondary to  acute CVA-left rubens and left cerebellum. .  Functional and medical status reassessed regarding patients ability to participate in therapies and patient found to be able to participate in acute intensive comprehensive inpatient rehabilitation program including PT/OT to improve balance, ambulation, ADLs, and to improve the P/AROM. Therapeutic modifications regarding activities in therapies, place, amount of time per day and intensity of therapy made daily. In bed therapies or bedside therapies prn. Bowel and Bladder dysfunction, Neurogenic bowel and bladder:  frequent toileting, ambulate to bathroom with assistance, check post void residuals. Check for C.difficile x1 if >2 loose stools in 24 hours, continue bowel & bladder program.  Monitor bowel and bladder function. Lactinex 2 PO every AC. MOM prn, Brown Bomb prn, Glycerin suppository prn, enema prn. Encourage therapy and nursing to co-treat and problem solve re continence. Moderate back pain, as well as generalized OA pain: reassess pain every shift and prior to and after each therapy session, give prn Tylenol and consider scheduled Tylenol, modalities prn in therapy, masage, Lidoderm, K-pad prn. Consider scheduled AM pain meds. Skin healing   and breakdown risk:  continue pressure relief program.  Daily skin exams and reports from nursing. Fatigue due to nutritional and hydration deficiency: Add and titrate vitamin B12 vitamin D and CoQ10 continue to monitor I&Os, calorie counts prn, dietary consult prn. Add healthy snack at night. Acute episodic insomnia with situational adjustment disorder:  prn Ambien, monitor for day time sedation. Falls risk elevated:  patient to use call light to get nursing assistance to get up, bed and chair alarm.   Elevated DVT risk: progressive activities in PT, continue prophylaxis ELENA hose, elevation and meds-see
of the above, the individualized therapy and Discharge plan will be:    -Times stated are an average that will be varied based on the patient's daily need. PT   1 1/2  hrs/day 5-7 days per wk      OT   1 1/2 hrs per day 5-7 days per wk     ST  1/2    hrs /day 3-5 days per week       Estimated LOS  1 1/2-2 week(s)    -Overall functional prognosis:     [x]  Good    []  Fair    []  Poor     -Medical Prognosis:   [x]  Good    []  Fair    []  Poor    This patient was made aware of the discussion of Plan of Care, their projected dicharge date and their projected function at discharge.          Rob Almanza, DO

## 2023-06-10 ENCOUNTER — HOSPITAL ENCOUNTER (EMERGENCY)
Age: 23
Discharge: HOME | End: 2023-06-10
Attending: EMERGENCY MEDICINE
Payer: MEDICARE

## 2023-06-10 ENCOUNTER — APPOINTMENT (OUTPATIENT)
Dept: CT IMAGING | Age: 23
End: 2023-06-10
Payer: MEDICARE

## 2023-06-10 ENCOUNTER — APPOINTMENT (OUTPATIENT)
Dept: GENERAL RADIOLOGY | Age: 23
End: 2023-06-10
Payer: MEDICARE

## 2023-06-10 VITALS
HEART RATE: 80 BPM | TEMPERATURE: 98.1 F | SYSTOLIC BLOOD PRESSURE: 123 MMHG | DIASTOLIC BLOOD PRESSURE: 76 MMHG | RESPIRATION RATE: 16 BRPM | OXYGEN SATURATION: 99 % | BODY MASS INDEX: 26.37 KG/M2 | WEIGHT: 135 LBS

## 2023-06-10 DIAGNOSIS — G45.9 TIA (TRANSIENT ISCHEMIC ATTACK): Primary | ICD-10-CM

## 2023-06-10 LAB
ALBUMIN SERPL-MCNC: 5 G/DL (ref 3.5–4.6)
ALP SERPL-CCNC: 143 U/L (ref 35–104)
ALT SERPL-CCNC: 91 U/L (ref 0–41)
ANION GAP SERPL CALCULATED.3IONS-SCNC: 12 MEQ/L (ref 9–15)
AST SERPL-CCNC: 32 U/L (ref 0–40)
BASOPHILS # BLD: 0.1 K/UL (ref 0–0.2)
BASOPHILS NFR BLD: 0.4 %
BILIRUB SERPL-MCNC: 0.4 MG/DL (ref 0.2–0.7)
BUN SERPL-MCNC: 13 MG/DL (ref 6–20)
CALCIUM SERPL-MCNC: 10 MG/DL (ref 8.5–9.9)
CHLORIDE SERPL-SCNC: 100 MEQ/L (ref 95–107)
CO2 SERPL-SCNC: 26 MEQ/L (ref 20–31)
CREAT SERPL-MCNC: 1 MG/DL (ref 0.7–1.2)
EKG ATRIAL RATE: 89 BPM
EKG P AXIS: 17 DEGREES
EKG P-R INTERVAL: 114 MS
EKG Q-T INTERVAL: 342 MS
EKG QRS DURATION: 84 MS
EKG QTC CALCULATION (BAZETT): 416 MS
EKG R AXIS: 31 DEGREES
EKG T AXIS: 25 DEGREES
EKG VENTRICULAR RATE: 89 BPM
EOSINOPHIL # BLD: 0.1 K/UL (ref 0–0.7)
EOSINOPHIL NFR BLD: 0.5 %
ERYTHROCYTE [DISTWIDTH] IN BLOOD BY AUTOMATED COUNT: 13.8 % (ref 11.5–14.5)
GLOBULIN SER CALC-MCNC: 3.4 G/DL (ref 2.3–3.5)
GLUCOSE SERPL-MCNC: 99 MG/DL (ref 70–99)
HCT VFR BLD AUTO: 44.3 % (ref 42–52)
HGB BLD-MCNC: 15.1 G/DL (ref 14–18)
LYMPHOCYTES # BLD: 2.4 K/UL (ref 1–4.8)
LYMPHOCYTES NFR BLD: 17.4 %
MCH RBC QN AUTO: 29.7 PG (ref 27–31.3)
MCHC RBC AUTO-ENTMCNC: 34.1 % (ref 33–37)
MCV RBC AUTO: 87.1 FL (ref 79–92.2)
MONOCYTES # BLD: 1 K/UL (ref 0.2–0.8)
MONOCYTES NFR BLD: 7.3 %
NEUTROPHILS # BLD: 10.4 K/UL (ref 1.4–6.5)
NEUTS SEG NFR BLD: 74.4 %
PLATELET # BLD AUTO: 372 K/UL (ref 130–400)
POTASSIUM SERPL-SCNC: 3.6 MEQ/L (ref 3.4–4.9)
PROT SERPL-MCNC: 8.4 G/DL (ref 6.3–8)
RBC # BLD AUTO: 5.08 M/UL (ref 4.7–6.1)
SODIUM SERPL-SCNC: 138 MEQ/L (ref 135–144)
TROPONIN T SERPL-MCNC: <0.01 NG/ML (ref 0–0.01)
WBC # BLD AUTO: 14 K/UL (ref 4.8–10.8)

## 2023-06-10 PROCEDURE — 85025 COMPLETE CBC W/AUTO DIFF WBC: CPT

## 2023-06-10 PROCEDURE — 36415 COLL VENOUS BLD VENIPUNCTURE: CPT

## 2023-06-10 PROCEDURE — 70450 CT HEAD/BRAIN W/O DYE: CPT

## 2023-06-10 PROCEDURE — 80053 COMPREHEN METABOLIC PANEL: CPT

## 2023-06-10 PROCEDURE — 71045 X-RAY EXAM CHEST 1 VIEW: CPT

## 2023-06-10 PROCEDURE — 84484 ASSAY OF TROPONIN QUANT: CPT

## 2023-06-10 ASSESSMENT — LIFESTYLE VARIABLES
HOW OFTEN DO YOU HAVE A DRINK CONTAINING ALCOHOL: NEVER
HOW MANY STANDARD DRINKS CONTAINING ALCOHOL DO YOU HAVE ON A TYPICAL DAY: PATIENT DOES NOT DRINK

## 2023-06-10 ASSESSMENT — PAIN - FUNCTIONAL ASSESSMENT
PAIN_FUNCTIONAL_ASSESSMENT: NONE - DENIES PAIN

## 2023-06-10 NOTE — ED PROVIDER NOTES
[address] [facility]  EMERGENCY DEPARTMENT ENCOUNTER      Pt Name: Kevin Olivas  MRN: [id number]  [place] 2000  Date of evaluation: 6/10/2023  Provider: Thora Collet, MD    CHIEF COMPLAINT       Chief Complaint   Patient presents with    Dizziness         HISTORY OF PRESENT ILLNESS   (Location/Symptom, Timing/Onset, Context/Setting, Quality, Duration, Modifying Factors, Severity)  Note limiting factors. 25-year-old male presenting with dizziness. Symptoms started at about 440p. He noticed some visual disturbances and difficulty swallowing. The symptoms lasted about 20 minutes. History of a stroke with symptoms similar to this in the past.  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 Currently on aspirin and Plavix. He is asymptomatic and without complaints currently. Follows with Hank Gruber, neurology. Nursing Notes were reviewed. REVIEW OF SYSTEMS    (2-9 systems for level 4, 10 or more for level 5)     Review of Systems   Neurological:  Positive for dizziness. All other systems reviewed and are negative. Except as noted above the remainder of the review of systems was reviewed and negative. PAST MEDICAL HISTORY     Past Medical History:   Diagnosis Date    Childhood medulloblastoma ([facility] [address] 5/28/2010    Developmental disorder of scholastic skills, unspecified 11/21/2018         SURGICAL HISTORY     History reviewed. No pertinent surgical history.       CURRENT MEDICATIONS       Previous Medications    ASPIRIN 81 MG CHEWABLE TABLET    Take 1 tablet by mouth daily    ATORVASTATIN (LIPITOR) 40 MG TABLET    Take 1 tablet by mouth nightly    CLOPIDOGREL (PLAVIX) 75 MG TABLET    Take 1 tablet by mouth daily    COENZYME Q10 100 MG CAPS CAPSULE    Take 1 capsule by mouth daily    FOLIC ACID (FOLVITE) 1 MG TABLET    Take 1 tablet by mouth daily    VITAMIN B-12 500 MCG TABLET    Take 1 tablet by mouth daily    VITAMIN B-6 (B-6) 25 MG

## 2023-06-10 NOTE — ED TRIAGE NOTES
Patient states that around 1640 today he developed dizziness, visual disturbances and states difficulty swallowing. Patient has had two strokes with in the past month. assisted to bathroom/repositioned/side rails up/wait time explained/warm blanket provided

## 2023-06-10 NOTE — ED NOTES
Pt states he had an episode of blurred vision and dizziness that lasted for about 20 minutes. Pt states he was also having difficulty swallowing which has since resolved. Family states pt has had 2 stroked since April 26 and was in the hospital and our rehab for 5 weeks.       Rachell Segovia RN  06/10/23 4304

## 2023-06-19 LAB
EKG ATRIAL RATE: 89 BPM
EKG P AXIS: 17 DEGREES
EKG P-R INTERVAL: 114 MS
EKG Q-T INTERVAL: 342 MS
EKG QRS DURATION: 84 MS
EKG QTC CALCULATION (BAZETT): 416 MS
EKG R AXIS: 31 DEGREES
EKG T AXIS: 25 DEGREES
EKG VENTRICULAR RATE: 89 BPM

## 2023-07-14 PROBLEM — H49.21 SIXTH NERVE PALSY OF RIGHT EYE: Status: ACTIVE | Noted: 2023-05-17

## 2023-07-14 PROBLEM — T14.90XA INJURY: Status: ACTIVE | Noted: 2023-07-14

## 2023-08-03 ENCOUNTER — OFFICE VISIT (OUTPATIENT)
Dept: NEUROLOGY | Age: 23
End: 2023-08-03
Payer: MEDICARE

## 2023-08-03 VITALS
HEART RATE: 69 BPM | WEIGHT: 143.2 LBS | BODY MASS INDEX: 27.97 KG/M2 | SYSTOLIC BLOOD PRESSURE: 122 MMHG | DIASTOLIC BLOOD PRESSURE: 78 MMHG

## 2023-08-03 DIAGNOSIS — I63.9 LEFT PONTINE CVA (HCC): ICD-10-CM

## 2023-08-03 DIAGNOSIS — I63.212 CEREBROVASCULAR ACCIDENT (CVA) DUE TO STENOSIS OF LEFT VERTEBRAL ARTERY (HCC): Primary | ICD-10-CM

## 2023-08-03 DIAGNOSIS — C71.6 MEDULLOBLASTOMA (HCC): ICD-10-CM

## 2023-08-03 DIAGNOSIS — H49.21 SIXTH NERVE PALSY OF RIGHT EYE: ICD-10-CM

## 2023-08-03 DIAGNOSIS — R27.0 ATAXIA: ICD-10-CM

## 2023-08-03 PROCEDURE — 99214 OFFICE O/P EST MOD 30 MIN: CPT | Performed by: PSYCHIATRY & NEUROLOGY

## 2023-08-03 PROCEDURE — G8419 CALC BMI OUT NRM PARAM NOF/U: HCPCS | Performed by: PSYCHIATRY & NEUROLOGY

## 2023-08-03 PROCEDURE — G8428 CUR MEDS NOT DOCUMENT: HCPCS | Performed by: PSYCHIATRY & NEUROLOGY

## 2023-08-03 PROCEDURE — 1036F TOBACCO NON-USER: CPT | Performed by: PSYCHIATRY & NEUROLOGY

## 2023-08-03 ASSESSMENT — ENCOUNTER SYMPTOMS
TROUBLE SWALLOWING: 0
NAUSEA: 0
COLOR CHANGE: 0
BACK PAIN: 0
VOMITING: 0
SHORTNESS OF BREATH: 0
PHOTOPHOBIA: 0
CHOKING: 0

## 2023-08-03 NOTE — PROGRESS NOTES
Subjective:      Patient ID: Melissa Romero is a 23 y.o. male who presents today for:  Chief Complaint   Patient presents with    Follow-Up from Hospital     Pt said things have been going okay, pt was at the eye doctor and has left sixth cranial palsy. Pt states the vision changes started when he had his stroke in April of 2023. Pt does have cataracts now as well. Pt stated he struggled with dizziness for a little when he would turn around but he doesn't have it anymore. Pt denies headaches. Pt does have slurred speech and is off balance when he is tired. Other     Pt has past hx of medulloblastoma as a child. HPI-17 year-old right-handed gentleman who we had seen in the hospital for stroke was diagnosed with a medulloblastoma with resection and chemotherapy radiation. Patient presented with acute left cerebellar and dorsal pontine infarct. History related to radiation she also had elevated homocysteine and was initiated B12 and folate. His complete hypercoag work-up was negative at bedside. Patient had complete work-up including echocardiogram to 5 gene was obtained and does not appear to be hemizygous and patient has a 6 palsy    Very well without any recurrent symptoms of concern. Past Medical History:   Diagnosis Date    Childhood medulloblastoma ([address]) 5/28/2010    Developmental disorder of scholastic skills, unspecified 11/21/2018     No past surgical history on file.   Social History     Socioeconomic History    Marital status: Single     Spouse name: Not on file    Number of children: 0    Years of education: 14    Highest education level: Not on file   Occupational History    Not on file   Tobacco Use    Smoking status: Never     Passive exposure: Never    Smokeless tobacco: Never   Vaping Use    Vaping Use: Never used   Substance and Sexual Activity    Alcohol use: Never    Drug use: Never    Sexual activity: Not Currently   Other Topics Concern    Not on file   Social History Narrative

## 2023-09-21 NOTE — TELEPHONE ENCOUNTER
Patient is requesting medication refill.  Please approve or deny this request.    Rx requested:  Requested Prescriptions     Pending Prescriptions Disp Refills    clopidogrel (PLAVIX) 75 MG tablet 30 tablet 3     Sig: Take 1 tablet by mouth daily    atorvastatin (LIPITOR) 40 MG tablet 30 tablet 3     Sig: Take 1 tablet by mouth nightly         Last Office Visit:   8/3/2023      Next Visit Date:  Future Appointments   Date Time Provider [address] [place] [address]   1/31/2024  2:00 PM Gian Tenorio MD [facility] -

## 2023-09-22 RX ORDER — CLOPIDOGREL BISULFATE 75 MG/1
75 TABLET ORAL DAILY
Qty: 30 TABLET | Refills: 3 | Status: SHIPPED | OUTPATIENT
Start: 2023-09-22

## 2023-09-22 RX ORDER — ATORVASTATIN CALCIUM 40 MG/1
40 TABLET, FILM COATED ORAL NIGHTLY
Qty: 30 TABLET | Refills: 3 | Status: SHIPPED | OUTPATIENT
Start: 2023-09-22

## 2024-01-19 PROBLEM — I63.212 CEREBROVASCULAR ACCIDENT (CVA) DUE TO STENOSIS OF LEFT VERTEBRAL ARTERY (HCC): Status: ACTIVE | Noted: 2024-01-19

## 2024-01-19 PROBLEM — G43.109 OCULAR MIGRAINE: Status: ACTIVE | Noted: 2023-06-21

## 2024-01-31 ENCOUNTER — OFFICE VISIT (OUTPATIENT)
Dept: NEUROLOGY | Age: 24
End: 2024-01-31
Payer: MEDICARE

## 2024-01-31 VITALS
DIASTOLIC BLOOD PRESSURE: 74 MMHG | HEART RATE: 100 BPM | WEIGHT: 139 LBS | SYSTOLIC BLOOD PRESSURE: 120 MMHG | BODY MASS INDEX: 27.15 KG/M2

## 2024-01-31 DIAGNOSIS — H49.21 SIXTH NERVE PALSY OF RIGHT EYE: ICD-10-CM

## 2024-01-31 DIAGNOSIS — I63.9 LEFT PONTINE CVA (HCC): ICD-10-CM

## 2024-01-31 DIAGNOSIS — I63.212 CEREBROVASCULAR ACCIDENT (CVA) DUE TO STENOSIS OF LEFT VERTEBRAL ARTERY (HCC): ICD-10-CM

## 2024-01-31 DIAGNOSIS — H47.293 OTHER OPTIC ATROPHY, BILATERAL: ICD-10-CM

## 2024-01-31 DIAGNOSIS — C71.6 MEDULLOBLASTOMA (HCC): Primary | ICD-10-CM

## 2024-01-31 PROCEDURE — 1036F TOBACCO NON-USER: CPT | Performed by: PSYCHIATRY & NEUROLOGY

## 2024-01-31 PROCEDURE — 99214 OFFICE O/P EST MOD 30 MIN: CPT | Performed by: PSYCHIATRY & NEUROLOGY

## 2024-01-31 PROCEDURE — G8484 FLU IMMUNIZE NO ADMIN: HCPCS | Performed by: PSYCHIATRY & NEUROLOGY

## 2024-01-31 PROCEDURE — G8427 DOCREV CUR MEDS BY ELIG CLIN: HCPCS | Performed by: PSYCHIATRY & NEUROLOGY

## 2024-01-31 PROCEDURE — G8419 CALC BMI OUT NRM PARAM NOF/U: HCPCS | Performed by: PSYCHIATRY & NEUROLOGY

## 2024-01-31 NOTE — PROGRESS NOTES
Subjective:      Patient ID: Demetrius Gill is a 23 y.o. male who presents today for:  Chief Complaint   Patient presents with    6 Month Follow-Up     Pt states things are good. No concerns at this time pt states he gets dizzy. 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  Patient had a stroke secondary to left vertebral artery stenosis.  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  Complete hypercoag workup done and EVELIO did not reveal anything sickle.  Patient ataxia from which she had continued to improve with a very subtle 6th nerve palsy.  Patient actually doing well and still has some dizziness and occasionally still has some residual double vision    Past Medical History:   Diagnosis Date    Childhood medulloblastoma (HCC) 5/28/2010    Developmental disorder of scholastic skills, unspecified 11/21/2018     No past surgical history on file.  Social History     Socioeconomic History    Marital status: Single     Spouse name: Not on file    Number of children: 0    Years of education: 14    Highest education level: Not on file   Occupational History    Not on file   Tobacco Use    Smoking status: Never     Passive exposure: Never    Smokeless tobacco: Never   Vaping Use    Vaping Use: Never used   Substance and Sexual Activity    Alcohol use: Never    Drug use: Never    Sexual activity: Not Currently   Other Topics Concern    Not on file   Social History Narrative     Patient lives alone he has a supportive Aunt at home and lives down the street as well as friends.  He lives in an the first

## 2024-02-14 RX ORDER — CLOPIDOGREL BISULFATE 75 MG/1
75 TABLET ORAL DAILY
Qty: 30 TABLET | Refills: 3 | Status: SHIPPED | OUTPATIENT
Start: 2024-02-14

## 2024-02-14 NOTE — TELEPHONE ENCOUNTER
Patient is  requesting medication refill. Please approve or deny this request.    Rx requested:  Requested Prescriptions     Pending Prescriptions Disp Refills    clopidogrel (PLAVIX) 75 MG tablet 30 tablet 3     Sig: Take 1 tablet by mouth daily         Last Office Visit:   1/31/2024      Next Visit Date:  Future Appointments   Date Time Provider Department Center   7/31/2024  1:15 PM Gian Ray MD [place] [facility] -

## 2024-04-05 ENCOUNTER — TELEPHONE (OUTPATIENT)
Dept: NEUROLOGY | Age: 24
End: 2024-04-05

## 2024-04-05 NOTE — TELEPHONE ENCOUNTER
Pt needs clearance to be off plavix for dental procedure on 06/10/2024.      Per verbal with Dr. Ray he is okay with this.     I tried to call [facility] in [place] ([phone]) where he is having this done and it went to a voicemail.  I LMOVM for them telling them that it was okay but if they needed a letter or had a paper for us to fill out to please call us back so that we could get a fax number from them and get the form or letter completed and sent to them.

## 2024-07-03 RX ORDER — CLOPIDOGREL BISULFATE 75 MG/1
75 TABLET ORAL DAILY
Qty: 30 TABLET | Refills: 3 | Status: SHIPPED | OUTPATIENT
Start: 2024-07-03

## 2024-07-03 NOTE — TELEPHONE ENCOUNTER
PHARMACY IS  requesting medication refill. Please approve or deny this request.    Rx requested:  Requested Prescriptions     Pending Prescriptions Disp Refills    clopidogrel (PLAVIX) 75 MG tablet [Pharmacy Med Name: clopidogrel 75 mg tablet] 30 tablet 3     Sig: TAKE 1 TABLET BY MOUTH DAILY         Last Office Visit:   1/31/2024      Next Visit Date:  Future Appointments   Date Time Provider Department Center   7/31/2024  1:15 PM Gian Ray MD [place] [facility] -

## 2024-07-08 RX ORDER — ATORVASTATIN CALCIUM 40 MG/1
40 TABLET, FILM COATED ORAL NIGHTLY
Qty: 30 TABLET | Refills: 3 | Status: SHIPPED | OUTPATIENT
Start: 2024-07-08

## 2024-07-08 NOTE — TELEPHONE ENCOUNTER
requesting medication refill. Please approve or deny this request.    Rx requested:  Requested Prescriptions     Pending Prescriptions Disp Refills    atorvastatin (LIPITOR) 40 MG tablet 30 tablet 3     Sig: Take 1 tablet by mouth nightly         Last Office Visit:   1/31/2024      Next Visit Date:  Future Appointments   Date Time Provider Department Center   7/31/2024  1:15 PM Gian Ray MD [place] [facility] -

## 2024-11-18 RX ORDER — ATORVASTATIN CALCIUM 40 MG/1
TABLET, FILM COATED ORAL
Qty: 30 TABLET | Refills: 3 | Status: SHIPPED | OUTPATIENT
Start: 2024-11-18

## 2024-11-18 RX ORDER — CLOPIDOGREL BISULFATE 75 MG/1
75 TABLET ORAL DAILY
Qty: 30 TABLET | Refills: 3 | Status: SHIPPED | OUTPATIENT
Start: 2024-11-18

## 2024-11-18 NOTE — TELEPHONE ENCOUNTER
Requesting medication refill. Please approve or deny this request.    Rx requested:  Requested Prescriptions     Pending Prescriptions Disp Refills    atorvastatin (LIPITOR) 40 MG tablet [Pharmacy Med Name: atorvastatin 40 mg tablet] 30 tablet 3     Sig: TAKE 1 TABLET BY MOUTH NIGHTLY AS DIRECTED    clopidogrel (PLAVIX) 75 MG tablet [Pharmacy Med Name: clopidogrel 75 mg tablet] 30 tablet 3     Sig: TAKE 1 TABLET BY MOUTH DAILY         Last Office Visit:   1/31/2024      Next Visit Date:  Future Appointments   Date Time Provider Department Center   11/25/2024  3:15 PM Gian Ray MD [place] [facility] -               Last refill 7/8/24. Please approve or deny.

## 2024-11-25 ENCOUNTER — OFFICE VISIT (OUTPATIENT)
Dept: NEUROLOGY | Age: 24
End: 2024-11-25
Payer: MEDICARE

## 2024-11-25 VITALS
DIASTOLIC BLOOD PRESSURE: 80 MMHG | HEART RATE: 85 BPM | BODY MASS INDEX: 25.58 KG/M2 | WEIGHT: 131 LBS | SYSTOLIC BLOOD PRESSURE: 116 MMHG

## 2024-11-25 DIAGNOSIS — H47.293 OTHER OPTIC ATROPHY, BILATERAL: ICD-10-CM

## 2024-11-25 DIAGNOSIS — G43.109 OCULAR MIGRAINE: ICD-10-CM

## 2024-11-25 DIAGNOSIS — R27.0 ATAXIA: ICD-10-CM

## 2024-11-25 DIAGNOSIS — I63.9 LEFT PONTINE CVA (HCC): ICD-10-CM

## 2024-11-25 DIAGNOSIS — C71.6 MEDULLOBLASTOMA (HCC): Primary | ICD-10-CM

## 2024-11-25 DIAGNOSIS — H49.21 SIXTH NERVE PALSY OF RIGHT EYE: ICD-10-CM

## 2024-11-25 PROCEDURE — 1036F TOBACCO NON-USER: CPT | Performed by: PSYCHIATRY & NEUROLOGY

## 2024-11-25 PROCEDURE — G8484 FLU IMMUNIZE NO ADMIN: HCPCS | Performed by: PSYCHIATRY & NEUROLOGY

## 2024-11-25 PROCEDURE — 99214 OFFICE O/P EST MOD 30 MIN: CPT | Performed by: PSYCHIATRY & NEUROLOGY

## 2024-11-25 PROCEDURE — G8419 CALC BMI OUT NRM PARAM NOF/U: HCPCS | Performed by: PSYCHIATRY & NEUROLOGY

## 2024-11-25 PROCEDURE — G8427 DOCREV CUR MEDS BY ELIG CLIN: HCPCS | Performed by: PSYCHIATRY & NEUROLOGY

## 2024-11-25 RX ORDER — IBUPROFEN 800 MG/1
TABLET, FILM COATED ORAL
COMMUNITY
Start: 2024-09-18

## 2024-11-25 NOTE — PROGRESS NOTES
Subjective:      Patient ID: Demetrius Gill is a 23 y.o. male who presents today for:  Chief Complaint   Patient presents with    Follow-up     Pt states when he gets up from sitting he is off balance. Has time when he walks and feels like he's being pulled to the left. Pt also states he has noticed left arm weakness since last week. Pt reports no falls.        HPI 23 right-handed gentleman now with history of medulloblastoma with optic atrophy and 6 no palsy.  Patient has ocular migraines as well.  Patient had a stroke secondary to left vertebral artery stenosis and then was diagnosed with a medulloblastoma with resection chemotherapy and radiation.  Patient has Sequent hypoplastic right vertebral artery and could have been from radiation hypercoagulable workup was negative.  Patient states that when he gets up from sitting is of balance.  When he walks he feels like he is going to be pulling to the left.  This last week he is complaining of new arm weakness on the left.  He has not any falls.  Past Medical History:   Diagnosis Date    Childhood medulloblastoma (HCC) 5/28/2010    Developmental disorder of scholastic skills, unspecified 11/21/2018     No past surgical history on file.  Social History     Socioeconomic History    Marital status: Single     Spouse name: Not on file    Number of children: 0    Years of education: 14    Highest education level: Not on file   Occupational History    Not on file   Tobacco Use    Smoking status: Never     Passive exposure: Never    Smokeless tobacco: Never   Vaping Use    Vaping status: Never Used   Substance and Sexual Activity    Alcohol use: Never    Drug use: Never    Sexual activity: Not Currently   Other Topics Concern    Not on file   Social History Narrative     Patient lives alone he has a supportive Aunt at home and lives down the street as well as friends.  He lives in an the first floor of the home he is unsure about how many stairs there are to enter.  He has no

## 2025-04-22 RX ORDER — CLOPIDOGREL BISULFATE 75 MG/1
75 TABLET ORAL DAILY
Qty: 30 TABLET | Refills: 3 | Status: SHIPPED | OUTPATIENT
Start: 2025-04-22

## 2025-04-22 NOTE — TELEPHONE ENCOUNTER
Requesting medication refill. Please approve or deny this request.    Rx requested:  Requested Prescriptions     Pending Prescriptions Disp Refills    clopidogrel (PLAVIX) 75 MG tablet [Pharmacy Med Name: clopidogrel 75 mg tablet] 30 tablet 3     Sig: TAKE 1 TABLET BY MOUTH DAILY         Last Office Visit:   11/25/2024      Next Visit Date:  Future Appointments   Date Time Provider Department Center   6/2/2025  3:15 PM Gian Ray MD [place] [facility] -               Last refill 11/18/24. Please approve or deny.

## 2025-05-05 RX ORDER — ATORVASTATIN CALCIUM 40 MG/1
TABLET, FILM COATED ORAL
Qty: 30 TABLET | Refills: 3 | Status: SHIPPED | OUTPATIENT
Start: 2025-05-05

## 2025-05-05 NOTE — TELEPHONE ENCOUNTER
Requesting medication refill. Please approve or deny this request.    Rx requested:  Requested Prescriptions     Pending Prescriptions Disp Refills    atorvastatin (LIPITOR) 40 MG tablet [Pharmacy Med Name: atorvastatin 40 mg tablet] 30 tablet 3     Sig: TAKE 1 TABLET BY MOUTH NIGHTLY AS DIRECTED         Last Office Visit:   11/25/2024      Next Visit Date:  Future Appointments   Date Time Provider Department Center   6/2/2025  3:15 PM Gian Ray MD [place] [facility] -               Last refill 11/18/24. Please approve or deny.

## 2025-06-05 ENCOUNTER — OFFICE VISIT (OUTPATIENT)
Age: 25
End: 2025-06-05
Payer: MEDICARE

## 2025-06-05 VITALS
SYSTOLIC BLOOD PRESSURE: 130 MMHG | WEIGHT: 141 LBS | HEART RATE: 111 BPM | DIASTOLIC BLOOD PRESSURE: 78 MMHG | BODY MASS INDEX: 27.54 KG/M2

## 2025-06-05 DIAGNOSIS — I63.9 LEFT PONTINE CVA (HCC): ICD-10-CM

## 2025-06-05 DIAGNOSIS — C71.6 MEDULLOBLASTOMA (HCC): ICD-10-CM

## 2025-06-05 DIAGNOSIS — R26.89 BALANCE PROBLEM: Primary | ICD-10-CM

## 2025-06-05 DIAGNOSIS — I69.30 LATE EFFECT OF ISCHEMIC CEREBRAL STROKE: ICD-10-CM

## 2025-06-05 DIAGNOSIS — H47.20 OPTIC ATROPHY: ICD-10-CM

## 2025-06-05 DIAGNOSIS — Z86.73 HISTORY OF STROKE: ICD-10-CM

## 2025-06-05 PROCEDURE — G8419 CALC BMI OUT NRM PARAM NOF/U: HCPCS | Performed by: PSYCHIATRY & NEUROLOGY

## 2025-06-05 PROCEDURE — 1036F TOBACCO NON-USER: CPT | Performed by: PSYCHIATRY & NEUROLOGY

## 2025-06-05 PROCEDURE — 99214 OFFICE O/P EST MOD 30 MIN: CPT | Performed by: PSYCHIATRY & NEUROLOGY

## 2025-06-05 PROCEDURE — 99213 OFFICE O/P EST LOW 20 MIN: CPT | Performed by: PSYCHIATRY & NEUROLOGY

## 2025-06-05 PROCEDURE — G8427 DOCREV CUR MEDS BY ELIG CLIN: HCPCS | Performed by: PSYCHIATRY & NEUROLOGY

## 2025-06-05 RX ORDER — CARBOXYMETHYLCELLULOSE SODIUM AND GLYCERIN 5; 9 MG/ML; MG/ML
1 SOLUTION/ DROPS OPHTHALMIC 4 TIMES DAILY
COMMUNITY
Start: 2025-02-21 | End: 2025-06-05

## 2025-06-05 NOTE — PROGRESS NOTES
Subjective:      Patient ID: Demetrius Gill is a 24 y.o. male who presents today for:  Chief Complaint   Patient presents with    6 Month Follow-Up     Pt reports no falls since last visit. Pt states his gate is off and uses his cane a lot more. Pt states there have been times where he gets up to quick and he has dizzy spells.        HPI 24-year right-handed gentleman with a medulloblastoma with optic atrophy with 6 no palsy and ocular migraines with ataxia.  We have the patient on Lipitor and Plavix.  Patient has ocular migraines for which we see him.  Patient seen for gait ataxia which is residual of medical blastoma radiation treatment.  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    Since last seen patient has not any falls.  His gait is still on and off and requires a cane.  He has some dizziness but but this is only occasionally orthostatic.    Patient's main issues appears to be his balance he has a disequilibrium balance syndrome.  Past Medical History:   Diagnosis Date    Childhood medulloblastoma (HCC) 5/28/2010    Developmental disorder of scholastic skills, unspecified 11/21/2018     No past surgical history on file.  Social History     Socioeconomic History    Marital status: Single     Spouse name: Not on file    Number of children: 0    Years of education: 14    Highest education level: Not on file   Occupational History    Not on file   Tobacco Use    Smoking status: Never     Passive exposure: Never    Smokeless tobacco: Never   Vaping Use    Vaping status: Never Used   Substance and Sexual Activity    Alcohol use: Never    Drug use: Never    Sexual activity: Not Currently   Other Topics Concern    Not on file   Social History Narrative     Patient lives alone he has a supportive Aunt at home and lives down the street as well as friends.  He lives in an the first floor of the home he is unsure about how many stairs there are to enter.  He has no home equipments no

## 2025-08-27 RX ORDER — CLOPIDOGREL BISULFATE 75 MG/1
75 TABLET ORAL DAILY
Qty: 30 TABLET | Refills: 3 | Status: SHIPPED | OUTPATIENT
Start: 2025-08-27

## 2025-08-28 RX ORDER — ATORVASTATIN CALCIUM 40 MG/1
TABLET, FILM COATED ORAL
Qty: 30 TABLET | Refills: 3 | Status: SHIPPED | OUTPATIENT
Start: 2025-08-28